# Patient Record
Sex: FEMALE | Race: WHITE | Employment: OTHER | URBAN - METROPOLITAN AREA
[De-identification: names, ages, dates, MRNs, and addresses within clinical notes are randomized per-mention and may not be internally consistent; named-entity substitution may affect disease eponyms.]

---

## 2017-02-08 ENCOUNTER — HOSPITAL ENCOUNTER (OUTPATIENT)
Dept: SURGERY | Age: 77
Discharge: HOME | DRG: 029 | End: 2017-02-08
Payer: MEDICARE

## 2017-02-08 VITALS
OXYGEN SATURATION: 99 % | WEIGHT: 109.06 LBS | HEIGHT: 62 IN | DIASTOLIC BLOOD PRESSURE: 63 MMHG | TEMPERATURE: 97.7 F | SYSTOLIC BLOOD PRESSURE: 137 MMHG | RESPIRATION RATE: 18 BRPM | HEART RATE: 70 BPM | BODY MASS INDEX: 20.07 KG/M2

## 2017-02-08 LAB
ANION GAP BLD CALC-SCNC: 6 MMOL/L (ref 7–16)
APPEARANCE UR: CLEAR
BACTERIA SPEC CULT: NORMAL
BASOPHILS # BLD AUTO: 0 K/UL (ref 0–0.2)
BASOPHILS # BLD: 0 % (ref 0–2)
BILIRUB UR QL: NEGATIVE
BUN SERPL-MCNC: 21 MG/DL (ref 8–23)
CALCIUM SERPL-MCNC: 9.5 MG/DL (ref 8.3–10.4)
CHLORIDE SERPL-SCNC: 106 MMOL/L (ref 98–107)
CO2 SERPL-SCNC: 31 MMOL/L (ref 21–32)
COLOR UR: YELLOW
CREAT SERPL-MCNC: 0.76 MG/DL (ref 0.6–1)
DIFFERENTIAL METHOD BLD: NORMAL
EOSINOPHIL # BLD: 0.1 K/UL (ref 0–0.8)
EOSINOPHIL NFR BLD: 2 % (ref 0.5–7.8)
ERYTHROCYTE [DISTWIDTH] IN BLOOD BY AUTOMATED COUNT: 13.1 % (ref 11.9–14.6)
GLUCOSE SERPL-MCNC: 91 MG/DL (ref 65–100)
GLUCOSE UR STRIP.AUTO-MCNC: NEGATIVE MG/DL
HCT VFR BLD AUTO: 38.1 % (ref 35.8–46.3)
HGB BLD-MCNC: 12.7 G/DL (ref 11.7–15.4)
HGB UR QL STRIP: NEGATIVE
IMM GRANULOCYTES # BLD: 0 K/UL (ref 0–0.5)
IMM GRANULOCYTES NFR BLD AUTO: 0.2 % (ref 0–5)
KETONES UR QL STRIP.AUTO: NEGATIVE MG/DL
LEUKOCYTE ESTERASE UR QL STRIP.AUTO: NEGATIVE
LYMPHOCYTES # BLD AUTO: 42 % (ref 13–44)
LYMPHOCYTES # BLD: 2.2 K/UL (ref 0.5–4.6)
MCH RBC QN AUTO: 31.2 PG (ref 26.1–32.9)
MCHC RBC AUTO-ENTMCNC: 33.3 G/DL (ref 31.4–35)
MCV RBC AUTO: 93.6 FL (ref 79.6–97.8)
MONOCYTES # BLD: 0.2 K/UL (ref 0.1–1.3)
MONOCYTES NFR BLD AUTO: 4 % (ref 4–12)
NEUTS SEG # BLD: 2.8 K/UL (ref 1.7–8.2)
NEUTS SEG NFR BLD AUTO: 52 % (ref 43–78)
NITRITE UR QL STRIP.AUTO: NEGATIVE
PH UR STRIP: 7 [PH] (ref 5–9)
PLATELET # BLD AUTO: 232 K/UL (ref 150–450)
PMV BLD AUTO: 12 FL (ref 10.8–14.1)
POTASSIUM SERPL-SCNC: 4.9 MMOL/L (ref 3.5–5.1)
PROT UR STRIP-MCNC: NEGATIVE MG/DL
RBC # BLD AUTO: 4.07 M/UL (ref 4.05–5.25)
SERVICE CMNT-IMP: NORMAL
SODIUM SERPL-SCNC: 143 MMOL/L (ref 136–145)
SP GR UR REFRACTOMETRY: 1.01 (ref 1–1.02)
UROBILINOGEN UR QL STRIP.AUTO: 0.2 EU/DL (ref 0.2–1)
WBC # BLD AUTO: 5.3 K/UL (ref 4.3–11.1)

## 2017-02-08 RX ORDER — LANOLIN ALCOHOL/MO/W.PET/CERES
400 CREAM (GRAM) TOPICAL
Status: ON HOLD | COMMUNITY
End: 2017-03-23 | Stop reason: CLARIF

## 2017-02-08 NOTE — PERIOP NOTES
Patient verified name, , and surgery as listed in [facility]. TYPE  CASE:1b  Labs per surgeon: CBC,BMP,U/A, MRSA  Labs per anesthesia protocol: no added   EKG  : 16   MRSA/MSSA:done  Pt instructed that they will be notified of positive results and will use mupirocin ointment as directed. Patient provided with handouts including guide to surgery , transfusions, pain management and hand hygiene for the family and community. Pt verbalizes understanding of all pre-op instructions . Instructed that family must be present in building at all times. Hibiclens and instructions given per hospital policy. Instructed patient to continue  previous medications as prescribed prior to surgery and  to take the following medications the day of surgery according to anesthesia guidelines : Omeprazole       Continue all previous medications unless otherwise directed. Original medication prescription bottles were not visualized during patient appointment. Instructed patient to hold  the following medications prior to surgery: Aspirin, Biotin, Multivitamin. Patient verbalized understanding of all instructions and provided all medical/health information to the best of their ability. Road to Recovery Spine surgery patient guide given. Instructed on incentive spirometry with return demonstration. Long handled prehab sponge given with instructions for use. Patient viewed spine prehab video.

## 2017-02-09 ENCOUNTER — ANESTHESIA EVENT (OUTPATIENT)
Dept: SURGERY | Age: 77
DRG: 029 | End: 2017-02-09
Payer: MEDICARE

## 2017-02-09 NOTE — PERIOP NOTES
MRSA/SA nasal swab results are both are negative. [facility] and requested old records.

## 2017-02-09 NOTE — PERIOP NOTES
Received faxed records from [facility] and placed on chart. These include last ov note and ekg 6/18/15,  Carotid ultrasound 4/25/13, normal myocardial perfusion scan 4/24/13.

## 2017-02-10 ENCOUNTER — APPOINTMENT (OUTPATIENT)
Dept: GENERAL RADIOLOGY | Age: 77
DRG: 029 | End: 2017-02-10
Attending: NEUROLOGICAL SURGERY
Payer: MEDICARE

## 2017-02-10 ENCOUNTER — HOSPITAL ENCOUNTER (INPATIENT)
Age: 77
LOS: 1 days | Discharge: HOME | DRG: 029 | End: 2017-02-11
Attending: NEUROLOGICAL SURGERY | Admitting: NEUROLOGICAL SURGERY
Payer: MEDICARE

## 2017-02-10 ENCOUNTER — ANESTHESIA (OUTPATIENT)
Dept: SURGERY | Age: 77
DRG: 029 | End: 2017-02-10
Payer: MEDICARE

## 2017-02-10 ENCOUNTER — SURGERY (OUTPATIENT)
Age: 77
End: 2017-02-10

## 2017-02-10 PROBLEM — G89.4 CHRONIC PAIN DISORDER: Status: ACTIVE | Noted: 2017-02-10

## 2017-02-10 PROCEDURE — 74011250636 HC RX REV CODE- 250/636: Performed by: ANESTHESIOLOGY

## 2017-02-10 PROCEDURE — 76060000034 HC ANESTHESIA 1.5 TO 2 HR: Performed by: NEUROLOGICAL SURGERY

## 2017-02-10 PROCEDURE — 74011000250 HC RX REV CODE- 250

## 2017-02-10 PROCEDURE — 77030018836 HC SOL IRR NACL ICUM -A: Performed by: NEUROLOGICAL SURGERY

## 2017-02-10 PROCEDURE — 77030014650 HC SEAL MTRX FLOSEL BAXT -C: Performed by: NEUROLOGICAL SURGERY

## 2017-02-10 PROCEDURE — 77030012935 HC DRSG AQUACEL BMS -B: Performed by: NEUROLOGICAL SURGERY

## 2017-02-10 PROCEDURE — 77030030163 HC BN WAX J&J -A: Performed by: NEUROLOGICAL SURGERY

## 2017-02-10 PROCEDURE — 77030011640 HC PAD GRND REM COVD -A: Performed by: NEUROLOGICAL SURGERY

## 2017-02-10 PROCEDURE — 77030020782 HC GWN BAIR PAWS FLX 3M -B: Performed by: ANESTHESIOLOGY

## 2017-02-10 PROCEDURE — 77030014007 HC SPNG HEMSTAT J&J -B: Performed by: NEUROLOGICAL SURGERY

## 2017-02-10 PROCEDURE — 72020 X-RAY EXAM OF SPINE 1 VIEW: CPT

## 2017-02-10 PROCEDURE — 77030008703 HC TU ET UNCUF COVD -A: Performed by: ANESTHESIOLOGY

## 2017-02-10 PROCEDURE — 76010000162 HC OR TIME 1.5 TO 2 HR INTENSV-TIER 1: Performed by: NEUROLOGICAL SURGERY

## 2017-02-10 PROCEDURE — 74011000250 HC RX REV CODE- 250: Performed by: NEUROLOGICAL SURGERY

## 2017-02-10 PROCEDURE — 77030002996 HC SUT SLK J&J -A: Performed by: NEUROLOGICAL SURGERY

## 2017-02-10 PROCEDURE — 74011250636 HC RX REV CODE- 250/636

## 2017-02-10 PROCEDURE — 77030003029 HC SUT VCRL J&J -B: Performed by: NEUROLOGICAL SURGERY

## 2017-02-10 PROCEDURE — 74011250637 HC RX REV CODE- 250/637: Performed by: NEUROLOGICAL SURGERY

## 2017-02-10 PROCEDURE — 77030008477 HC STYL SATN SLP COVD -A: Performed by: ANESTHESIOLOGY

## 2017-02-10 PROCEDURE — 0JH70CZ INSERTION OF SINGLE ARRAY RECHARGEABLE STIMULATOR GENERATOR INTO BACK SUBCUTANEOUS TISSUE AND FASCIA, OPEN APPROACH: ICD-10-PCS | Performed by: NEUROLOGICAL SURGERY

## 2017-02-10 PROCEDURE — 77030018390 HC SPNG HEMSTAT2 J&J -B: Performed by: NEUROLOGICAL SURGERY

## 2017-02-10 PROCEDURE — 77030034479 HC ADH SKN CLSR PRINEO J&J -B: Performed by: NEUROLOGICAL SURGERY

## 2017-02-10 PROCEDURE — 76210000016 HC OR PH I REC 1 TO 1.5 HR: Performed by: NEUROLOGICAL SURGERY

## 2017-02-10 PROCEDURE — 00HV3MZ INSERTION OF NEUROSTIMULATOR LEAD INTO SPINAL CORD, PERCUTANEOUS APPROACH: ICD-10-PCS | Performed by: NEUROLOGICAL SURGERY

## 2017-02-10 PROCEDURE — 65270000029 HC RM PRIVATE

## 2017-02-10 PROCEDURE — 74011250636 HC RX REV CODE- 250/636: Performed by: NEUROLOGICAL SURGERY

## 2017-02-10 PROCEDURE — C1820 GENERATOR NEURO RECHG BAT SY: HCPCS | Performed by: NEUROLOGICAL SURGERY

## 2017-02-10 PROCEDURE — 77030032490 HC SLV COMPR SCD KNE COVD -B: Performed by: NEUROLOGICAL SURGERY

## 2017-02-10 PROCEDURE — 74011000258 HC RX REV CODE- 258: Performed by: NEUROLOGICAL SURGERY

## 2017-02-10 PROCEDURE — 77030019557 HC ELECTRD VES SEAL MEDT -F: Performed by: NEUROLOGICAL SURGERY

## 2017-02-10 DEVICE — LEAD 977C165 SPECIFY SRSCN 565 SRG EMAN
Type: IMPLANTABLE DEVICE | Site: SPINE THORACIC | Status: FUNCTIONAL
Brand: SPECIFY® SURESCAN® MRI 5-6-5

## 2017-02-10 DEVICE — INS 97714 RESTORE SENSOR MRICS
Type: IMPLANTABLE DEVICE | Site: HIP | Status: FUNCTIONAL
Brand: RESTORESENSOR™SURESCAN®

## 2017-02-10 RX ORDER — MIRTAZAPINE 15 MG/1
15 TABLET, FILM COATED ORAL
Status: DISCONTINUED | OUTPATIENT
Start: 2017-02-10 | End: 2017-02-11 | Stop reason: HOSPADM

## 2017-02-10 RX ORDER — SIMVASTATIN 40 MG/1
40 TABLET, FILM COATED ORAL
Status: DISCONTINUED | OUTPATIENT
Start: 2017-02-10 | End: 2017-02-11 | Stop reason: HOSPADM

## 2017-02-10 RX ORDER — LIDOCAINE HYDROCHLORIDE 20 MG/ML
INJECTION, SOLUTION EPIDURAL; INFILTRATION; INTRACAUDAL; PERINEURAL AS NEEDED
Status: DISCONTINUED | OUTPATIENT
Start: 2017-02-10 | End: 2017-02-10 | Stop reason: HOSPADM

## 2017-02-10 RX ORDER — LIDOCAINE HYDROCHLORIDE AND EPINEPHRINE 10; 10 MG/ML; UG/ML
INJECTION, SOLUTION INFILTRATION; PERINEURAL AS NEEDED
Status: DISCONTINUED | OUTPATIENT
Start: 2017-02-10 | End: 2017-02-10 | Stop reason: HOSPADM

## 2017-02-10 RX ORDER — NEOSTIGMINE METHYLSULFATE 1 MG/ML
INJECTION INTRAVENOUS AS NEEDED
Status: DISCONTINUED | OUTPATIENT
Start: 2017-02-10 | End: 2017-02-10 | Stop reason: HOSPADM

## 2017-02-10 RX ORDER — CEFAZOLIN SODIUM 1 G/3ML
INJECTION, POWDER, FOR SOLUTION INTRAMUSCULAR; INTRAVENOUS AS NEEDED
Status: DISCONTINUED | OUTPATIENT
Start: 2017-02-10 | End: 2017-02-10 | Stop reason: HOSPADM

## 2017-02-10 RX ORDER — LOTEPREDNOL ETABONATE 5 MG/ML
1 SUSPENSION/ DROPS OPHTHALMIC 2 TIMES DAILY
Status: DISCONTINUED | OUTPATIENT
Start: 2017-02-10 | End: 2017-02-11 | Stop reason: HOSPADM

## 2017-02-10 RX ORDER — OXYCODONE AND ACETAMINOPHEN 7.5; 325 MG/1; MG/1
1 TABLET ORAL
Qty: 30 TAB | Refills: 0 | Status: SHIPPED | OUTPATIENT
Start: 2017-02-10

## 2017-02-10 RX ORDER — MELATONIN
1000
Status: DISCONTINUED | OUTPATIENT
Start: 2017-02-10 | End: 2017-02-11 | Stop reason: HOSPADM

## 2017-02-10 RX ORDER — GUAIFENESIN 100 MG/5ML
81 LIQUID (ML) ORAL
Status: DISCONTINUED | OUTPATIENT
Start: 2017-02-10 | End: 2017-02-11 | Stop reason: HOSPADM

## 2017-02-10 RX ORDER — SODIUM CHLORIDE, SODIUM LACTATE, POTASSIUM CHLORIDE, CALCIUM CHLORIDE 600; 310; 30; 20 MG/100ML; MG/100ML; MG/100ML; MG/100ML
100 INJECTION, SOLUTION INTRAVENOUS CONTINUOUS
Status: DISPENSED | OUTPATIENT
Start: 2017-02-10 | End: 2017-02-11

## 2017-02-10 RX ORDER — ROCURONIUM BROMIDE 10 MG/ML
INJECTION, SOLUTION INTRAVENOUS AS NEEDED
Status: DISCONTINUED | OUTPATIENT
Start: 2017-02-10 | End: 2017-02-10 | Stop reason: HOSPADM

## 2017-02-10 RX ORDER — SODIUM CHLORIDE 0.9 % (FLUSH) 0.9 %
5-10 SYRINGE (ML) INJECTION EVERY 8 HOURS
Status: DISCONTINUED | OUTPATIENT
Start: 2017-02-10 | End: 2017-02-11 | Stop reason: HOSPADM

## 2017-02-10 RX ORDER — PROPOFOL 10 MG/ML
INJECTION, EMULSION INTRAVENOUS AS NEEDED
Status: DISCONTINUED | OUTPATIENT
Start: 2017-02-10 | End: 2017-02-10 | Stop reason: HOSPADM

## 2017-02-10 RX ORDER — PANTOPRAZOLE SODIUM 40 MG/1
40 TABLET, DELAYED RELEASE ORAL
Status: DISCONTINUED | OUTPATIENT
Start: 2017-02-11 | End: 2017-02-11 | Stop reason: HOSPADM

## 2017-02-10 RX ORDER — ACETAMINOPHEN 325 MG/1
650 TABLET ORAL
Status: DISCONTINUED | OUTPATIENT
Start: 2017-02-10 | End: 2017-02-11 | Stop reason: HOSPADM

## 2017-02-10 RX ORDER — SODIUM CHLORIDE, SODIUM LACTATE, POTASSIUM CHLORIDE, CALCIUM CHLORIDE 600; 310; 30; 20 MG/100ML; MG/100ML; MG/100ML; MG/100ML
100 INJECTION, SOLUTION INTRAVENOUS CONTINUOUS
Status: DISCONTINUED | OUTPATIENT
Start: 2017-02-10 | End: 2017-02-10 | Stop reason: HOSPADM

## 2017-02-10 RX ORDER — ONDANSETRON 2 MG/ML
INJECTION INTRAMUSCULAR; INTRAVENOUS AS NEEDED
Status: DISCONTINUED | OUTPATIENT
Start: 2017-02-10 | End: 2017-02-10 | Stop reason: HOSPADM

## 2017-02-10 RX ORDER — POTASSIUM CHLORIDE 750 MG/1
10 TABLET, EXTENDED RELEASE ORAL
Status: DISCONTINUED | OUTPATIENT
Start: 2017-02-11 | End: 2017-02-11 | Stop reason: HOSPADM

## 2017-02-10 RX ORDER — VANCOMYCIN HYDROCHLORIDE 1 G/20ML
INJECTION, POWDER, LYOPHILIZED, FOR SOLUTION INTRAVENOUS AS NEEDED
Status: DISCONTINUED | OUTPATIENT
Start: 2017-02-10 | End: 2017-02-10 | Stop reason: HOSPADM

## 2017-02-10 RX ORDER — ZOLPIDEM TARTRATE 10 MG/1
5 TABLET ORAL
Status: DISCONTINUED | OUTPATIENT
Start: 2017-02-10 | End: 2017-02-11 | Stop reason: HOSPADM

## 2017-02-10 RX ORDER — HYDROMORPHONE HYDROCHLORIDE 2 MG/ML
0.5 INJECTION, SOLUTION INTRAMUSCULAR; INTRAVENOUS; SUBCUTANEOUS
Status: COMPLETED | OUTPATIENT
Start: 2017-02-10 | End: 2017-02-10

## 2017-02-10 RX ORDER — OXYCODONE AND ACETAMINOPHEN 7.5; 325 MG/1; MG/1
1 TABLET ORAL
Status: DISCONTINUED | OUTPATIENT
Start: 2017-02-10 | End: 2017-02-11 | Stop reason: HOSPADM

## 2017-02-10 RX ORDER — MIDAZOLAM HYDROCHLORIDE 1 MG/ML
2 INJECTION, SOLUTION INTRAMUSCULAR; INTRAVENOUS ONCE
Status: ACTIVE | OUTPATIENT
Start: 2017-02-10 | End: 2017-02-10

## 2017-02-10 RX ORDER — FENTANYL CITRATE 50 UG/ML
100 INJECTION, SOLUTION INTRAMUSCULAR; INTRAVENOUS ONCE
Status: ACTIVE | OUTPATIENT
Start: 2017-02-10 | End: 2017-02-10

## 2017-02-10 RX ORDER — FENTANYL CITRATE 50 UG/ML
INJECTION, SOLUTION INTRAMUSCULAR; INTRAVENOUS AS NEEDED
Status: DISCONTINUED | OUTPATIENT
Start: 2017-02-10 | End: 2017-02-10 | Stop reason: HOSPADM

## 2017-02-10 RX ORDER — LANOLIN ALCOHOL/MO/W.PET/CERES
400 CREAM (GRAM) TOPICAL
Status: DISCONTINUED | OUTPATIENT
Start: 2017-02-10 | End: 2017-02-11 | Stop reason: HOSPADM

## 2017-02-10 RX ORDER — DIAPER,BRIEF,INFANT-TODD,DISP
10000 EACH MISCELLANEOUS
Status: DISCONTINUED | OUTPATIENT
Start: 2017-02-11 | End: 2017-02-11 | Stop reason: HOSPADM

## 2017-02-10 RX ORDER — GLYCOPYRROLATE 0.2 MG/ML
INJECTION INTRAMUSCULAR; INTRAVENOUS AS NEEDED
Status: DISCONTINUED | OUTPATIENT
Start: 2017-02-10 | End: 2017-02-10 | Stop reason: HOSPADM

## 2017-02-10 RX ORDER — MIDAZOLAM HYDROCHLORIDE 1 MG/ML
2 INJECTION, SOLUTION INTRAMUSCULAR; INTRAVENOUS
Status: DISCONTINUED | OUTPATIENT
Start: 2017-02-10 | End: 2017-02-11 | Stop reason: HOSPADM

## 2017-02-10 RX ORDER — SODIUM CHLORIDE, SODIUM LACTATE, POTASSIUM CHLORIDE, CALCIUM CHLORIDE 600; 310; 30; 20 MG/100ML; MG/100ML; MG/100ML; MG/100ML
75 INJECTION, SOLUTION INTRAVENOUS CONTINUOUS
Status: DISCONTINUED | OUTPATIENT
Start: 2017-02-10 | End: 2017-02-11 | Stop reason: HOSPADM

## 2017-02-10 RX ORDER — ACETAMINOPHEN 10 MG/ML
1000 INJECTION, SOLUTION INTRAVENOUS ONCE
Status: COMPLETED | OUTPATIENT
Start: 2017-02-10 | End: 2017-02-10

## 2017-02-10 RX ORDER — OXYCODONE HYDROCHLORIDE 5 MG/1
5 TABLET ORAL
Status: DISCONTINUED | OUTPATIENT
Start: 2017-02-10 | End: 2017-02-10 | Stop reason: HOSPADM

## 2017-02-10 RX ORDER — SODIUM CHLORIDE 0.9 % (FLUSH) 0.9 %
5-10 SYRINGE (ML) INJECTION AS NEEDED
Status: DISCONTINUED | OUTPATIENT
Start: 2017-02-10 | End: 2017-02-11 | Stop reason: HOSPADM

## 2017-02-10 RX ORDER — LIDOCAINE HYDROCHLORIDE 10 MG/ML
0.1 INJECTION INFILTRATION; PERINEURAL AS NEEDED
Status: DISCONTINUED | OUTPATIENT
Start: 2017-02-10 | End: 2017-02-11 | Stop reason: HOSPADM

## 2017-02-10 RX ORDER — DEXAMETHASONE SODIUM PHOSPHATE 4 MG/ML
INJECTION, SOLUTION INTRA-ARTICULAR; INTRALESIONAL; INTRAMUSCULAR; INTRAVENOUS; SOFT TISSUE AS NEEDED
Status: DISCONTINUED | OUTPATIENT
Start: 2017-02-10 | End: 2017-02-10 | Stop reason: HOSPADM

## 2017-02-10 RX ADMIN — LIDOCAINE HYDROCHLORIDE AND EPINEPHRINE 6 ML: 10; 10 INJECTION, SOLUTION INFILTRATION; PERINEURAL at 13:18

## 2017-02-10 RX ADMIN — LIDOCAINE HYDROCHLORIDE 60 MG: 20 INJECTION, SOLUTION EPIDURAL; INFILTRATION; INTRACAUDAL; PERINEURAL at 11:58

## 2017-02-10 RX ADMIN — ONDANSETRON 4 MG: 2 INJECTION INTRAMUSCULAR; INTRAVENOUS at 12:37

## 2017-02-10 RX ADMIN — HYDROMORPHONE HYDROCHLORIDE 0.5 MG: 2 INJECTION, SOLUTION INTRAMUSCULAR; INTRAVENOUS; SUBCUTANEOUS at 14:15

## 2017-02-10 RX ADMIN — FENTANYL CITRATE 100 MCG: 50 INJECTION, SOLUTION INTRAMUSCULAR; INTRAVENOUS at 11:54

## 2017-02-10 RX ADMIN — OXYCODONE HYDROCHLORIDE AND ACETAMINOPHEN 1 TABLET: 7.5; 325 TABLET ORAL at 22:32

## 2017-02-10 RX ADMIN — VANCOMYCIN HYDROCHLORIDE 750 MG: 10 INJECTION, POWDER, LYOPHILIZED, FOR SOLUTION INTRAVENOUS at 21:00

## 2017-02-10 RX ADMIN — FENTANYL CITRATE 100 MCG: 50 INJECTION, SOLUTION INTRAMUSCULAR; INTRAVENOUS at 12:25

## 2017-02-10 RX ADMIN — OXYCODONE HYDROCHLORIDE AND ACETAMINOPHEN 1 TABLET: 7.5; 325 TABLET ORAL at 17:23

## 2017-02-10 RX ADMIN — Medication 400 MG: at 17:23

## 2017-02-10 RX ADMIN — SODIUM CHLORIDE, SODIUM LACTATE, POTASSIUM CHLORIDE, AND CALCIUM CHLORIDE 100 ML/HR: 600; 310; 30; 20 INJECTION, SOLUTION INTRAVENOUS at 09:00

## 2017-02-10 RX ADMIN — NEOSTIGMINE METHYLSULFATE 3 MG: 1 INJECTION INTRAVENOUS at 13:20

## 2017-02-10 RX ADMIN — HYDROMORPHONE HYDROCHLORIDE 0.5 MG: 2 INJECTION, SOLUTION INTRAMUSCULAR; INTRAVENOUS; SUBCUTANEOUS at 13:53

## 2017-02-10 RX ADMIN — VANCOMYCIN HYDROCHLORIDE 1 G: 1 INJECTION, POWDER, LYOPHILIZED, FOR SOLUTION INTRAVENOUS at 12:27

## 2017-02-10 RX ADMIN — SIMVASTATIN 40 MG: 40 TABLET, FILM COATED ORAL at 22:32

## 2017-02-10 RX ADMIN — THROMBIN, TOPICAL (BOVINE) 10000 UNITS: KIT at 12:28

## 2017-02-10 RX ADMIN — Medication 10 ML: at 22:33

## 2017-02-10 RX ADMIN — HYDROMORPHONE HYDROCHLORIDE 0.5 MG: 2 INJECTION, SOLUTION INTRAMUSCULAR; INTRAVENOUS; SUBCUTANEOUS at 13:58

## 2017-02-10 RX ADMIN — ASPIRIN 81 MG 81 MG: 81 TABLET ORAL at 22:32

## 2017-02-10 RX ADMIN — ROCURONIUM BROMIDE 40 MG: 10 INJECTION, SOLUTION INTRAVENOUS at 11:58

## 2017-02-10 RX ADMIN — HYDROMORPHONE HYDROCHLORIDE 0.5 MG: 2 INJECTION, SOLUTION INTRAMUSCULAR; INTRAVENOUS; SUBCUTANEOUS at 14:05

## 2017-02-10 RX ADMIN — VITAMIN D, TAB 1000IU (100/BT) 1000 UNITS: 25 TAB at 17:23

## 2017-02-10 RX ADMIN — MIRTAZAPINE 15 MG: 15 TABLET, FILM COATED ORAL at 22:32

## 2017-02-10 RX ADMIN — GLYCOPYRROLATE 0.4 MG: 0.2 INJECTION INTRAMUSCULAR; INTRAVENOUS at 13:20

## 2017-02-10 RX ADMIN — DEXAMETHASONE SODIUM PHOSPHATE 4 MG: 4 INJECTION, SOLUTION INTRA-ARTICULAR; INTRALESIONAL; INTRAMUSCULAR; INTRAVENOUS; SOFT TISSUE at 12:37

## 2017-02-10 RX ADMIN — SODIUM CHLORIDE, SODIUM LACTATE, POTASSIUM CHLORIDE, AND CALCIUM CHLORIDE: 600; 310; 30; 20 INJECTION, SOLUTION INTRAVENOUS at 13:20

## 2017-02-10 RX ADMIN — PROPOFOL 150 MG: 10 INJECTION, EMULSION INTRAVENOUS at 11:58

## 2017-02-10 RX ADMIN — LOTEPREDNOL ETABONATE 1 DROP: 5 SUSPENSION/ DROPS OPHTHALMIC at 18:00

## 2017-02-10 RX ADMIN — VANCOMYCIN HYDROCHLORIDE 750 MG: 10 INJECTION, POWDER, LYOPHILIZED, FOR SOLUTION INTRAVENOUS at 09:00

## 2017-02-10 RX ADMIN — CEFAZOLIN SODIUM 2 G: 1 INJECTION, POWDER, FOR SOLUTION INTRAMUSCULAR; INTRAVENOUS at 12:18

## 2017-02-10 RX ADMIN — VANCOMYCIN HYDROCHLORIDE 1 G: 1 INJECTION, POWDER, LYOPHILIZED, FOR SOLUTION INTRAVENOUS at 12:28

## 2017-02-10 RX ADMIN — ACETAMINOPHEN 1000 MG: 10 INJECTION, SOLUTION INTRAVENOUS at 14:22

## 2017-02-10 RX ADMIN — SODIUM CHLORIDE, SODIUM LACTATE, POTASSIUM CHLORIDE, AND CALCIUM CHLORIDE 75 ML/HR: 600; 310; 30; 20 INJECTION, SOLUTION INTRAVENOUS at 15:39

## 2017-02-10 RX ADMIN — AZTREONAM 2 G: 2 INJECTION, POWDER, LYOPHILIZED, FOR SOLUTION INTRAMUSCULAR; INTRAVENOUS at 11:53

## 2017-02-10 NOTE — IP AVS SNAPSHOT
Roya Saunders 
 
 
 [address] [facility] 
[phone] Patient: Adwoa Montgomery MRN: [id number] EAS:[birth year] You are allergic to the following Allergen Reactions Lortab (Hydrocodone-Acetaminophen) Unknown (comments) Lunesta (Eszopiclone) Other (comments) Nightmares Recent Documentation Height Weight Breastfeeding? BMI OB Status Smoking Status 1.575 m 49.5 kg No 19.95 kg/m2 Hysterectomy Never Smoker Emergency Contacts Name Discharge Info Relation Home Work Mobile Cheng Martinez DISCHARGE CAREGIVER [3] Spouse [3] [phone] About your hospitalization You were admitted on:  February 10, 2017 You last received care in the:  [facility] You were discharged on:  February 11, 2017 Unit phone number:  [phone] Why you were hospitalized Your primary diagnosis was:  Chronic Pain Disorder Providers Seen During Your Hospitalizations Provider Role Specialty Primary office phone Amber Jaime MD Attending Provider Neurosurgery [phone] Your Primary Care Physician (PCP) Primary Care Physician Office Phone Office Fax Jessica Terrazas [phone] Follow-up Information Follow up With Details Comments Contact Info Mishra [employer], DO   [address] [facility] [address] [phone] Amber Jaime MD Call Call on monday for appointment in 10 days  11 [facility] [address] [facility] [address] [address] [phone] Current Discharge Medication List  
  
CONTINUE these medications which have CHANGED Dose & Instructions Dispensing Information Comments Morning Noon Evening Bedtime * oxyCODONE-acetaminophen 7.5-325 mg per tablet Commonly known as:  PERCOCET 7.5 What changed:  Another medication with the same name was added. Make sure you understand how and when to take each. Your next dose is: Today, Tomorrow Other:  _________ Dose:  1 Tab Take 1 Tab by mouth every eight (8) hours as needed for Pain. Max Daily Amount: 3 Tabs. Quantity:  21 Tab Refills:  0  
     
   
   
   
  
 * oxyCODONE-acetaminophen 7.5-325 mg per tablet Commonly known as:  PERCOCET 7.5 What changed: You were already taking a medication with the same name, and this prescription was added. Make sure you understand how and when to take each. Your next dose is: Today, Tomorrow Other:  _________ Dose:  1 Tab Take 1 Tab by mouth every eight (8) hours as needed for Pain. Max Daily Amount: 3 Tabs. Quantity:  30 Tab Refills:  0  
     
   
   
   
  
 * Notice: This list has 2 medication(s) that are the same as other medications prescribed for you. Read the directions carefully, and ask your doctor or other care provider to review them with you. CONTINUE these medications which have NOT CHANGED Dose & Instructions Dispensing Information Comments Morning Noon Evening Bedtime  
 aspirin 81 mg chewable tablet Your next dose is: Today, Tomorrow Other:  _________ Dose:  81 mg Take 81 mg by mouth nightly. Refills:  0  
     
   
   
   
  
 biotin 10,000 mcg Cap Your next dose is: Today, Tomorrow Other:  _________ Take  by mouth daily (after lunch). Refills:  0 LOTEMAX 0.5 % ophthalmic suspension Generic drug:  loteprednol etabonate Your next dose is: Today, Tomorrow Other:  _________ Dose:  1 Drop Administer 1 Drop to both eyes two (2) times a day. Refills:  0  
     
   
   
   
  
 magnesium oxide 400 mg tablet Commonly known as:  MAG-OX Your next dose is: Today, Tomorrow Other:  _________ Dose:  400 mg Take 400 mg by mouth daily (after lunch). Refills:  0  
     
   
   
   
  
 mirtazapine 15 mg tablet Commonly known as:  Shelagh Kick Your next dose is: Today, Tomorrow Other:  _________ Dose:  15 mg Take 1 Tab by mouth nightly. Patient needs an Office visit for further refill Quantity:  90 Tab Refills:  3  
     
   
   
   
  
 multivitamin tablet Commonly known as:  ONE A DAY Your next dose is: Today, Tomorrow Other:  _________ Dose:  1 Tab Take 1 Tab by mouth daily (after lunch). Refills:  0  
     
   
   
   
  
 omeprazole 20 mg capsule Commonly known as:  PRILOSEC Your next dose is: Today, Tomorrow Other:  _________ Dose:  20 mg Take 1 Cap by mouth every morning. Indications: GASTROESOPHAGEAL REFLUX Quantity:  90 Cap Refills:  1  
     
   
   
   
  
 potassium 99 mg tablet Your next dose is: Today, Tomorrow Other:  _________ Dose:  99 mg Take 99 mg by mouth daily (after lunch). Refills:  0  
     
   
   
   
  
 simvastatin 40 mg tablet Commonly known as:  ZOCOR Your next dose is: Today, Tomorrow Other:  _________ Dose:  40 mg Take 1 Tab by mouth nightly. Indications: HYPERCHOLESTEROLEMIA Quantity:  90 Tab Refills:  1  
     
   
   
   
  
 traMADol 50 mg tablet Commonly known as:  ULTRAM  
   
Your next dose is: Today, Tomorrow Other:  _________ Dose:  50 mg Take 1 Tab by mouth every eight (8) hours as needed for Pain. Max Daily Amount: 150 mg.  
 Quantity:  90 Tab Refills:  3 VITAMIN D3 1,000 unit tablet Generic drug:  cholecalciferol Your next dose is: Today, Tomorrow Other:  _________ Dose:  1000 Units Take 1,000 Units by mouth daily (after lunch). Refills:  0 Where to Get Your Medications Information on where to get these meds will be given to you by the nurse or doctor. ! Ask your nurse or doctor about these medications  
  oxyCODONE-acetaminophen 7.5-325 mg per tablet Discharge Instructions NO showers/baths-->SPONGE BATH ONLY CHANGE dressing DAILYsponge bath-->remove old dressing-->apply fresh dressing ACTIVITY as tolerated MAY drive as directed Avoid having pets sleep in bed with you until incision is completely healed CALL DR. Steele:  Fever >100.5 Incision becomes red, swollen or opens up Incision has yellow, thick drainage or an odor Pain is not managed with prescribed medications Excessive nausea and/or vomiting 
 
-->If you have a question about how to use the stimulator please call the Rep for the stimulator Discharge Orders None ACO Transitions of Care Introducing [employer] [address] Hilda Anguiano offers a voluntary care coordination program to provide high quality service and care to [facility] fee-for-service beneficiaries. Anthony Sullivan was designed to help you enhance your health and well-being through the following services: ? Transitions of Care  support for individuals who are transitioning from one care setting to another (example: Hospital to home). ? Chronic and Complex Care Coordination  support for individuals and caregivers of those with serious or chronic illnesses or with more than one chronic (ongoing) condition and those who take a number of different medications. If you meet specific medical criteria, a [facility] [address] may call you directly to coordinate your care with your primary care physician and your other care providers. For questions about the [facility] programs, please, contact your physicians office. For general questions or additional information about Accountable Care Organizations: 
Please visit www.medicare.gov/acos. html or call 1-800-MEDICARE (7-165.823.3026) TTY users should call 0-528.880.8017. Introducing [facility] SERVICES! Ba Major introduces [employer] patient portal. Now you can access parts of your medical record, email your doctor's office, and request medication refills online. 1. In your internet browser, go to [email or web address] 2. Click on the First Time User? Click Here link in the Sign In box. You will see the New Member Sign Up page. 3. Enter your [employer] Access Code exactly as it appears below. You will not need to use this code after youve completed the sign-up process. If you do not sign up before the expiration date, you must request a new code. · [employer] Access Code: [email or web address] Expires: 4/19/2017  3:25 PM 
 
4. Enter the last four digits of your Social Security Number (xxxx) and Date of Birth (mm/dd/yyyy) as indicated and click Submit. You will be taken to the next sign-up page. 5. Create a [employer] ID. This will be your [employer] login ID and cannot be changed, so think of one that is secure and easy to remember. 6. Create a [employer] password. You can change your password at any time. 7. Enter your Password Reset Question and Answer. This can be used at a later time if you forget your password. 8. Enter your e-mail address. You will receive e-mail notification when new information is available in [address]. 9. Click Sign Up. You can now view and download portions of your medical record. 10. Click the Download Summary menu link to download a portable copy of your medical information. If you have questions, please visit the Frequently Asked Questions section of the [employer] website. Remember, [employer] is NOT to be used for urgent needs. For medical emergencies, dial 911. Now available from your iPhone and Android! General Information Please provide this summary of care documentation to your next provider. Patient Signature:  ____________________________________________________________ Date:  ____________________________________________________________  
  
[place] [facility] Provider Signature:  ____________________________________________________________ Date:  ____________________________________________________________

## 2017-02-10 NOTE — ANESTHESIA POSTPROCEDURE EVALUATION
Post-Anesthesia Evaluation and Assessment    Patient: Mamie Ac MRN: [id number]  SSN: [id number]    YOB: 1940  Age: 68 y.o. Sex: female       Cardiovascular Function/Vital Signs  Visit Vitals    /84 (BP 1 Location: Right arm, BP Patient Position: At rest)    Pulse 83    Temp 36.7 °C (98 °F)    Resp 14    Ht 5' 2\" (1.575 m)    Wt 49.5 kg (109 lb 1 oz)    SpO2 100%    BMI 19.95 kg/m2       Patient is status post general anesthesia for Procedure(s):  SPINAL CORD STIMULATOR INSERTION. Nausea/Vomiting: None    Postoperative hydration reviewed and adequate. Pain:  Pain Scale 1: Numeric (0 - 10) (02/10/17 1434)  Pain Intensity 1: 6 (02/10/17 1434)   Managed    Neurological Status:   Neuro (WDL): Within Defined Limits (02/10/17 1434)  Neuro  Neurologic State: Alert (02/10/17 1434)  LUE Motor Response: Purposeful (02/10/17 1434)  LLE Motor Response: Purposeful (02/10/17 1434)  RUE Motor Response: Purposeful (02/10/17 1434)  RLE Motor Response: Purposeful (02/10/17 1434)   At baseline    Mental Status and Level of Consciousness: Awake. Pulmonary Status:   O2 Device: Nasal cannula (02/10/17 1434)   Adequate oxygenation and airway patent    Complications related to anesthesia: None    Post-anesthesia assessment completed.  No concerns    Signed By: Bravo Cobos MD     February 10, 2017

## 2017-02-10 NOTE — PROGRESS NOTES
Nutrition  Reason for assessment: Referral received from nursing admission Malnutrition Screening Tool for recently lost 6# without trying and eating poorly due to decreased appetite. Assessment:   Weights per EMR:  WT / BMI 2/10/2017 2/8/2017 1/19/2017 7/20/2016   WEIGHT 109 lb 1 oz 109 lb 1 oz 100 lb 109 lb 1 oz   BODY MASS INDEX 19.95 kg/m2 19.95 kg/m2 18.89 kg/m2 20.62 kg/m2     WT / BMI 7/18/2016 6/27/2016 4/15/2016   WEIGHT 109 lb 1 oz 110 lb 110 lb 6.4 oz   BODY MASS INDEX 20.62 kg/m2 20.8 kg/m2 20.87 kg/m2   RD cannot verify if these weights are accurate (estimated vs stated vs measured). Per weights above pt with a clinically insignificant 1# weight loss over the past 10 months. MST screen is deferred at this time. RD to order ensure enlive due to reports of decreased appetite upon admission. RD to follow up per standards of care. Daquan Mcbride [address], [address] [phone]

## 2017-02-10 NOTE — PROGRESS NOTES
TRANSFER - IN REPORT:    Verbal report received from SUHAS Ramirez on Izell Sicks  being received from PACU for routine progression of care      Report consisted of patients Situation, Background, Assessment and   Recommendations(SBAR). Information from the following report(s) SBAR, Kardex and OR Summary was reviewed with the receiving nurse. Opportunity for questions and clarification was provided. Assessment completed upon patients arrival to unit and room 704 and care assumed.

## 2017-02-10 NOTE — BRIEF OP NOTE
BRIEF OPERATIVE NOTE    Date of Procedure: 2/10/2017   Preoperative Diagnosis: Chronic pain syndrome [G89.4]  Postoperative Diagnosis: Chronic pain syndrome [G89.4]    Procedure(s):  SPINAL CORD STIMULATOR INSERTION  Surgeon(s) and Role:     * Alona Omer MD - Primary            Surgical Staff:  Circ-1: Luke Pryor RN  Circ-Relief: Jorge Luz RN  Radiology Technician: Poornima Coley, RT, R, CT  Scrub Tech-1: Jennifer Wei  Scrub Tech-2: Edilma Hernández  Scrub Tech-Relief: Indu Gray  Scrub Private/Assistant: Fermín Huber  Event Time In   Incision Start 1218   Incision Close      Anesthesia: General   Estimated Blood Loss: MINIMAL  Specimens: * No specimens in log *   Findings: T 8 PLACEMENT  Complications: NONE  Implants:   Implant Name Type Inv.  Item Serial No.  Lot No. LRB No. Used Action   specify surescan mri lead kit    [employer] [id number] N/A 1 Implanted   GENERATOR NEUROSTIM RCHRG RSSS -- Casey Nico MRI - [id number]   GENERATOR NEUROSTIM RCHRG RSSS -- Casey Nico MRI [id number] [employer]   Left 1 Implanted

## 2017-02-10 NOTE — PROGRESS NOTES
Dual skin assessment completed by this RN and Ximena Mendez RN. Patient has dressing to lower back and left side. Heels are dry and flaky, no other skin issues noted.

## 2017-02-10 NOTE — ROUTINE PROCESS
TRANSFER - IN REPORT:    Verbal report received from [address] on Orpah Ebbing  being received from [phone]  for routine progression of care      Report consisted of patients Situation, Background, Assessment and   Recommendations(SBAR). Information from the following report(s) SBAR, Intake/Output and MAR was reviewed with the receiving nurse. Opportunity for questions and clarification was provided. Assessment completed upon patients arrival to unit and care assumed.

## 2017-02-10 NOTE — ANESTHESIA PREPROCEDURE EVALUATION
Anesthetic History     PONV          Review of Systems / Medical History  Pertinent labs reviewed    Pulmonary  Within defined limits                 Neuro/Psych   Within defined limits          Comments: Parkinsonism Cardiovascular              CAD (angioplasty 2014)    Exercise tolerance: <4 METS: Limited by back pain       GI/Hepatic/Renal     GERD           Endo/Other        Arthritis and anemia     Other Findings            Physical Exam    Airway  Mallampati: II  TM Distance: 4 - 6 cm  Neck ROM: normal range of motion   Mouth opening: Normal     Cardiovascular  Regular rate and rhythm,  S1 and S2 normal,  no murmur, click, rub, or gallop             Dental    Dentition: Full upper dentures and Lower partial plate     Pulmonary  Breath sounds clear to auscultation               Abdominal  GI exam deferred       Other Findings            Anesthetic Plan    ASA: 2  Anesthesia type: general          Induction: Intravenous  Anesthetic plan and risks discussed with: Patient and Family

## 2017-02-10 NOTE — IP AVS SNAPSHOT
Current Discharge Medication List  
  
Take these medications at their scheduled times Dose & Instructions Dispensing Information Comments Morning Noon Evening Bedtime  
 aspirin 81 mg chewable tablet Your next dose is: Today, Tomorrow Other:  ____________ Dose:  81 mg Take 81 mg by mouth nightly. Refills:  0  
     
   
   
   
  
 biotin 10,000 mcg Cap Your next dose is: Today, Tomorrow Other:  ____________ Take  by mouth daily (after lunch). Refills:  0 LOTEMAX 0.5 % ophthalmic suspension Generic drug:  loteprednol etabonate Your next dose is: Today, Tomorrow Other:  ____________ Dose:  1 Drop Administer 1 Drop to both eyes two (2) times a day. Refills:  0  
     
   
   
   
  
 magnesium oxide 400 mg tablet Commonly known as:  MAG-OX Your next dose is: Today, Tomorrow Other:  ____________ Dose:  400 mg Take 400 mg by mouth daily (after lunch). Refills:  0  
     
   
   
   
  
 mirtazapine 15 mg tablet Commonly known as:  Kwasi Section Your next dose is: Today, Tomorrow Other:  ____________ Dose:  15 mg Take 1 Tab by mouth nightly. Patient needs an Office visit for further refill Quantity:  90 Tab Refills:  3  
     
   
   
   
  
 multivitamin tablet Commonly known as:  ONE A DAY Your next dose is: Today, Tomorrow Other:  ____________ Dose:  1 Tab Take 1 Tab by mouth daily (after lunch). Refills:  0  
     
   
   
   
  
 omeprazole 20 mg capsule Commonly known as:  PRILOSEC Your next dose is: Today, Tomorrow Other:  ____________ Dose:  20 mg Take 1 Cap by mouth every morning. Indications: GASTROESOPHAGEAL REFLUX Quantity:  90 Cap Refills:  1  
     
   
   
   
  
 potassium 99 mg tablet Your next dose is: Today, Tomorrow Other:  ____________ Dose:  99 mg Take 99 mg by mouth daily (after lunch). Refills:  0  
     
   
   
   
  
 simvastatin 40 mg tablet Commonly known as:  ZOCOR Your next dose is: Today, Tomorrow Other:  ____________ Dose:  40 mg Take 1 Tab by mouth nightly. Indications: HYPERCHOLESTEROLEMIA Quantity:  90 Tab Refills:  1 VITAMIN D3 1,000 unit tablet Generic drug:  cholecalciferol Your next dose is: Today, Tomorrow Other:  ____________ Dose:  1000 Units Take 1,000 Units by mouth daily (after lunch). Refills:  0 Take these medications as needed Dose & Instructions Dispensing Information Comments Morning Noon Evening Bedtime * oxyCODONE-acetaminophen 7.5-325 mg per tablet Commonly known as:  PERCOCET 7.5 Your next dose is: Today, Tomorrow Other:  ____________ Dose:  1 Tab Take 1 Tab by mouth every eight (8) hours as needed for Pain. Max Daily Amount: 3 Tabs. Quantity:  21 Tab Refills:  0  
     
   
   
   
  
 * oxyCODONE-acetaminophen 7.5-325 mg per tablet Commonly known as:  PERCOCET 7.5 Your next dose is: Today, Tomorrow Other:  ____________ Dose:  1 Tab Take 1 Tab by mouth every eight (8) hours as needed for Pain. Max Daily Amount: 3 Tabs. Quantity:  30 Tab Refills:  0  
     
   
   
   
  
 traMADol 50 mg tablet Commonly known as:  ULTRAM  
   
Your next dose is: Today, Tomorrow Other:  ____________ Dose:  50 mg Take 1 Tab by mouth every eight (8) hours as needed for Pain. Max Daily Amount: 150 mg.  
 Quantity:  90 Tab Refills:  3  
     
   
   
   
  
 * Notice: This list has 2 medication(s) that are the same as other medications prescribed for you. Read the directions carefully, and ask your doctor or other care provider to review them with you. Where to Get Your Medications Information about where to get these medications is not yet available ! Ask your nurse or doctor about these medications  
  oxyCODONE-acetaminophen 7.5-325 mg per tablet

## 2017-02-10 NOTE — OP NOTES
[facility] 65   OPERATIVE REPORT       Name:  Brian Greer   MR#:  [id number]   :  1940   Account #:  [de-identified]   Date of Adm:  02/10/2017       DATE OF SURGERY: 02/10/2017. PREPROCEDURE DIAGNOSES:    1. Chronic pain syndrome. 2. Chronic neuropathic pain in the extremities. POSTPROCEDURE DIAGNOSES:    1. Chronic pain syndrome. 2. Chronic neuropathic pain in the extremities. PROCEDURE:    1. Bilateral T9 laminectomy for implantation of neurospinal   stimulator electrode paddle, [employer]tronic. 2. Implantation of neurospinal stimulator pulse generator,   Medtronic. 3. Continuous intraoperative fluoroscopy. SURGEON: Cameron Alsa. Clover Lowery MD.    ANESTHESIA: General endotracheal.    ESTIMATED BLOOD LOSS: Minimal.    PREPARATION: ChloraPrep. COMPLICATIONS: None. HISTORY OF PRESENT ILLNESS: A 72-year-old lady with chronic pain   syndrome and chronic neuropathic pain of the extremities. She   underwent greater than 6 months of conservative treatment   without any relief. MRI showed scoliosis and degenerative   change, but no surgical pathology. Spinal cord stimulator trial   was highly beneficial and she now enters the hospital for   permanent implantation. OPERATIVE NOTE: The patient was brought to the operating room   and was carefully placed under general endotracheal anesthesia   without complications, carefully turned prone on the OSI bed on   top of the [place] frame and using AP fluoroscopy, the T9   level was marked bilaterally and a pocket for the battery in the   left side below the belt. The entire back was prepped and draped   in the usual sterile fashion. Two incisions were drawn out in the   thoracic spine and battery pocket region respectively. They were   infiltrated with 1% Xylocaine with epinephrine and incised with   a 15 blade in the thoracic spine.  Muscles, fascia, and soft   tissues were dissected off the lamina of T9 bilaterally with   cautery and elevators and deep retractors were placed. AP   fluoroscopy confirmed the T9-10 level. T9 laminectomy was   carried out with Leksell rongeurs and 3 mm Kerrison rongeur. The   dura was decompressed bilaterally. Bone edges were waxed. The   battery pocket was developed with 15 blade Bovie cautery and an   [employer] retractor dry pocket was developed. The surgical team   changed gloves at this point. The electrode paddle was brought   onto the field and was placed in the midline, encompassing all   of T8 and part of T7 and T9 without any complications. It was   noted to be in the midline. It was secured to the T10 spinous   process with double 0 silk suture through the bone and   protective anchors. Tunneling incision was made to the battery   site to the thoracic spine incision through which the plastic   sheath was left in its tract and the wires were passed under the   subcutaneous tissues to the battery and connected. The battery   was tested. It was functioning well and, therefore, was packed   into the battery pocket with the wires redundantly posteriorly   without any complications and it sat well. Both wounds were   copiously irrigated until clear. Thrombin soaked Gelfoam was   used for hemostasis along with FloSeal and thrombin. Vancomycin   powder was placed for infection prophylaxis and the wounds were   closed. The subcutaneous tissues were closed with interrupted 3-  0 Vicryl. Fascia was closed with 0 Vicryl and skin was closed   with Prineo tape and Dermabond and sterile dressings were   placed. Final x-rays were obtained which confirmed good position   of the electrode paddle. The patient tolerated the procedure   well, was turned supine, awakened, extubated, and taken to PACU   in stable condition. There were no obvious complications.         MD ANDRE Ro / Magalis Douglas   D:  02/10/2017   13:39   T:  02/10/2017   14:02   Job #:  [id number]

## 2017-02-11 VITALS
HEIGHT: 62 IN | BODY MASS INDEX: 20.07 KG/M2 | RESPIRATION RATE: 18 BRPM | WEIGHT: 109.06 LBS | OXYGEN SATURATION: 95 % | DIASTOLIC BLOOD PRESSURE: 57 MMHG | TEMPERATURE: 97.8 F | SYSTOLIC BLOOD PRESSURE: 119 MMHG | HEART RATE: 70 BPM

## 2017-02-11 PROCEDURE — 97166 OT EVAL MOD COMPLEX 45 MIN: CPT

## 2017-02-11 PROCEDURE — 97162 PT EVAL MOD COMPLEX 30 MIN: CPT

## 2017-02-11 PROCEDURE — 74011250636 HC RX REV CODE- 250/636: Performed by: NEUROLOGICAL SURGERY

## 2017-02-11 PROCEDURE — 74011250637 HC RX REV CODE- 250/637: Performed by: NEUROLOGICAL SURGERY

## 2017-02-11 PROCEDURE — 97530 THERAPEUTIC ACTIVITIES: CPT

## 2017-02-11 RX ADMIN — POTASSIUM CHLORIDE 10 MEQ: 750 TABLET, EXTENDED RELEASE ORAL at 14:20

## 2017-02-11 RX ADMIN — LOTEPREDNOL ETABONATE 1 DROP: 5 SUSPENSION/ DROPS OPHTHALMIC at 08:05

## 2017-02-11 RX ADMIN — PANTOPRAZOLE SODIUM 40 MG: 40 TABLET, DELAYED RELEASE ORAL at 05:03

## 2017-02-11 RX ADMIN — OXYCODONE HYDROCHLORIDE AND ACETAMINOPHEN 1 TABLET: 7.5; 325 TABLET ORAL at 07:23

## 2017-02-11 RX ADMIN — OXYCODONE HYDROCHLORIDE AND ACETAMINOPHEN 1 TABLET: 7.5; 325 TABLET ORAL at 03:05

## 2017-02-11 RX ADMIN — VITAMIN D, TAB 1000IU (100/BT) 1000 UNITS: 25 TAB at 14:20

## 2017-02-11 RX ADMIN — OXYCODONE HYDROCHLORIDE AND ACETAMINOPHEN 1 TABLET: 7.5; 325 TABLET ORAL at 14:20

## 2017-02-11 RX ADMIN — Medication 10 ML: at 06:00

## 2017-02-11 RX ADMIN — Medication 400 MG: at 14:20

## 2017-02-11 RX ADMIN — VANCOMYCIN HYDROCHLORIDE 750 MG: 10 INJECTION, POWDER, LYOPHILIZED, FOR SOLUTION INTRAVENOUS at 08:05

## 2017-02-11 NOTE — PROGRESS NOTES
Problem: Mobility Impaired (Adult and Pediatric)  Goal: *Acute Goals and Plan of Care (Insert Text)  Discharge Goals:  (1.)Ms. Shelly Delgado will perform bed mobility via log roll with CGA within 7 day(s). (2.)Ms. Shelly Delgado will transfer from bed to chair and chair to bed with CONTACT GUARD ASSIST using the least restrictive device within 7 day(s). (3.)Ms. Martinez will ambulate with CONTACT GUARD ASSIST for 100+ feet with the least restrictive device within 7 day(s). (4.)Ms. Shelly Delgado will tolerate 25+ minutes of therapeutic activity/exercise while maintaining stable vitals to improve functional strength and mobility within 7 day(s). ________________________________________________________________________________________________      PHYSICAL THERAPY: INITIAL ASSESSMENT, TREATMENT DAY: DAY OF ASSESSMENT, AM    2/11/2017  INPATIENT: Hospital Day: 2  [place] / [place] / Product Type: Medicare /       Spinal Precautions     NAME/AGE/GENDER: Precious Vazquez is a 68 y.o. female    PRIMARY DIAGNOSIS: Chronic pain syndrome [G89.4] Chronic pain disorder Chronic pain disorder  Procedure(s) (LRB):  SPINAL CORD STIMULATOR INSERTION (N/A)  1 Day Post-Op  ICD-10: Treatment Diagnosis:       · Generalized Muscle Weakness (M62.81)  · Difficulty in walking, Not elsewhere classified (R26.2)  · Low Back Pain (M54.5)   Precaution/Allergies:  Lortab [hydrocodone-acetaminophen] and Lunesta [eszopiclone]       ASSESSMENT:      Ms. Shelly Delgado presents is a 68year old female 1 day s/p T9 bilaterally laminectomy for spinal cord stimulator insertion with spinal precautions. Patient seen this AM for initial physical therapy evaluation: presents sitting up in bedside chair, oriented x4, and endorses 8/10 low back pain. Patient lives with her spouse in a single story residence with 5 steps to enter. At baseline, has been ambulating with a rollator at home and  assists with ADLs and stair negotiaiton.  Patient endorses recent falls and B LE weakness R>L PTA. Endorses decreased outpatient physical therapy tolerance secondary to pain. Today, L LE strength functionally 4-/5, R LE strength functionally 3+/5, and R dorsiflexion 3-/5, and sensation is intact to light touch C4-T1 and L3-S1 B. Patient performed transfers and ambulation x7ft with RW and minimal assistance. Demonstrated kyphotic posture with trunk flexion and a slow, shuffling gait pattern with fair- dynamic balance throughout. Endorsed increasing dizziness and nausea in standing, quickly returned to sitting and vitals assessed: BP: 127/47 HR: 78bpm. Rested in chair x2 minutes and vitals reassessed: BP: 99/44 HR: 73bpm and patient continues to be symptomatic. Transitioned back into supine with minimal assistance and BP recovered: 108/62 HR: 73bpm and symptoms resolved. RN notified. Ms. Paulo Workman presents with decreased functional mobility and balance/gait status from baseline. Recommend continued skilled PT services to address stated deficits. Will follow with DAILY plan of care and progress toward stated goals during acute stay. This section established at most recent assessment   PROBLEM LIST (Impairments causing functional limitations):  1. Decreased Strength  2. Decreased ADL/Functional Activities  3. Decreased Transfer Abilities  4. Decreased Ambulation Ability/Technique  5. Decreased Balance  6. Increased Pain  7. Decreased Activity Tolerance  8. Decreased Knowledge of Precautions  9. Decreased [place] with Home Exercise Program    INTERVENTIONS PLANNED: (Benefits and precautions of physical therapy have been discussed with the patient.)  1. Balance Exercise  2. Bed Mobility  3. Family Education  4. Gait Training  5. Home Exercise Program (HEP)  6. Neuromuscular Re-education/Strengthening  7. Range of Motion (ROM)  8. Therapeutic Activites  9. Therapeutic Exercise/Strengthening  10. Transfer Training  11.  Group Therapy      TREATMENT PLAN: Frequency/Duration: daily for duration of hospital stay  Rehabilitation Potential For Stated Goals: GOOD      RECOMMENDED REHABILITATION/EQUIPMENT: (at time of discharge pending progress): Continue Skilled Therapy. HISTORY:   History of Present Injury/Illness (Reason for Referral):  S/p spinal cord stimulator, B LE weakness  Past Medical History/Comorbidities:   Ms. Tony Lee  has a past medical history of Blood loss anemia; CAD (coronary artery disease) (01/2014); Cervical myelopathy; Cervical spinal stenosis; Chronic lower back pain; GERD (gastroesophageal reflux disease); H/O methicillin resistant Staphylococcus aureus infection; History of staph infection (2012); Insomnia; Lumbar radiculopathy, acute; Lumbar stenosis without neurogenic claudication; Mixed hyperlipidemia; Nausea & vomiting; Other intervertebral disc degeneration, lumbar region; Primary insomnia; PUD (peptic ulcer disease); Scoliosis, idiopathic, infantile; and Spinal stenosis, cervical region. She also has no past medical history of Unspecified adverse effect of anesthesia. Ms. Tony Lee  has a past surgical history that includes lap cholecystectomy; appendectomy; bladder repair (2010); hysterectomy (1972); oophorectomy (1999); orthopaedic (01/2015); colonoscopy (2014); endoscopy (10/2015); angioplasty; tumor removal (at age of 40); cervical fusion (07/2016); and cataract removal (Bilateral). Social History/Living Environment:   Home Environment: Private residence  # Steps to Enter: 5  One/Two Story Residence: One story  Lift Chair Available: No  Living Alone: No  Support Systems: Spouse/Significant Other/Partner  Patient Expects to be Discharged to[de-identified] Private residence  Current DME Used/Available at Home: Glucometer, Blood pressure cuff, Walker  Prior Level of Function/Work/Activity:  At baseline, has been ambulating with a rollator at home and  assists with ADLs and stair negotiaiton.  Patient endorses recent falls and B LE weakness R>L PTA. Endorses decreased outpatient physical therapy tolerance secondary to pain. Number of Personal Factors/Comorbidities that affect the Plan of Care: 1-2: MODERATE COMPLEXITY   EXAMINATION:   Most Recent Physical Functioning:   Gross Assessment:  AROM: Generally decreased, functional (R dorsiflexion)  Strength: Generally decreased, functional (L LE functionally 4-/5; R LE 3+/5, R dorsiflexion 3-/5)  Coordination: Generally decreased, functional (B LE R>L)  Sensation: Intact (Light touch C4-T1 and L3-S1 B)               Posture:  Posture (WDL): Exceptions to WDL  Posture Assessment: Forward head, Kyphosis, Trunk flexion  Balance:  Sitting: Intact  Sitting - Static: Good (unsupported)  Sitting - Dynamic: Good (unsupported)  Standing: Impaired  Standing - Static: Fair  Standing - Dynamic : Fair (-) Bed Mobility:  Sit to Supine: Minimum assistance  Scooting: Contact guard assistance  Wheelchair Mobility:     Transfers:  Sit to Stand: Minimum assistance  Stand to Sit: Minimum assistance  Bed to Chair: Minimum assistance  Gait:     Base of Support: Narrowed; Center of gravity altered  Speed/Ann: Slow;Shuffled  Step Length: Right shortened;Left shortened  Swing Pattern: Right asymmetrical  Gait Abnormalities: Decreased step clearance;Shuffling gait; Foot drop;Trunk sway increased; Step to gait  Distance (ft): 7 Feet (ft)  Assistive Device: Walker, rolling  Ambulation - Level of Assistance: Minimal assistance  Interventions: Safety awareness training; Tactile cues; Verbal cues       Body Structures Involved:  1. Bones  2. Joints  3. Muscles  4. Ligaments Body Functions Affected:  1. Neuromusculoskeletal  2. Movement Related Activities and Participation Affected:  1. General Tasks and Demands  2. Mobility  3. Self Care  4.  Domestic Life   Number of elements that affect the Plan of Care: 4+: HIGH COMPLEXITY   CLINICAL PRESENTATION:   Presentation: Evolving clinical presentation with changing clinical characteristics: MODERATE COMPLEXITY   CLINICAL DECISION MAKIN[address] [place] [id number] AM-PAC 6 Clicks   Basic Mobility Inpatient Short Form  How much difficulty does the patient currently have. .. Unable A Lot A Little None   1. Turning over in bed (including adjusting bedclothes, sheets and blankets)? [ ] 1   [ ] 2   [X] 3   [ ] 4   2. Sitting down on and standing up from a chair with arms ( e.g., wheelchair, bedside commode, etc.)   [ ] 1   [ ] 2   [X] 3   [ ] 4   3. Moving from lying on back to sitting on the side of the bed? [ ] 1   [ ] 2   [X] 3   [ ] 4   How much help from another person does the patient currently need. .. Total A Lot A Little None   4. Moving to and from a bed to a chair (including a wheelchair)? [ ] 1   [ ] 2   [X] 3   [ ] 4   5. Need to walk in hospital room? [ ] 1   [ ] 2   [X] 3   [ ] 4   6. Climbing 3-5 steps with a railing? [ ] 1   [X] 2   [ ] 3   [ ] 4   © , Trustees of [address] [place] [id number], under license to [employer]. All rights reserved    Score:  Initial: 17 Most Recent: 17 (Date: 17 )     Interpretation of Tool:  Represents activities that are increasingly more difficult (i.e. Bed mobility, Transfers, Gait). Score 24 23 22-20 19-15 14-10 9-7 6       Modifier CH CI CJ CK CL CM CN         · Mobility - Walking and Moving Around:               - CURRENT STATUS:    CK - 40%-59% impaired, limited or restricted               - GOAL STATUS:           CJ - 20%-39% impaired, limited or restricted               - D/C STATUS:                       ---------------To be determined---------------  [place] / [place] / Product Type: Medicare /       Medical Necessity:     · Patient demonstrates good rehab potential due to higher previous functional level. Reason for Services/Other Comments:  · Patient continues to require skilled intervention due to decreased functional mobility and balance/gait status from baseline. Janiya Angelo Use of outcome tool(s) and clinical judgement create a POC that gives a: Questionable prediction of patient's progress: MODERATE COMPLEXITY                 TREATMENT:       Pre-treatment Symptoms/Complaints:    Pain: Initial:   Pain Intensity 1: 8  Pain Location 1: Back  Pain Orientation 1: Lower  Pain Intervention(s) 1: Ambulation/Increased Activity, Emotional support, Rest, Repositioned  Post Session:  Comfortable in bed; pain unchanged. See objective for vitals. Initial Evaluation  Therapeutic Activity: (    8 Minutes): Therapeutic activities including bed mobility, transfer training, balance training, safety awareness training, ambulation on level ground x7ft, and patient education to improve mobility, strength, balance and coordination. Required minimal Safety awareness training; Tactile cues; Verbal cues to promote static and dynamic balance in standing and promote coordination of bilateral, lower extremity(s). Braces/Orthotics/Lines/Etc:   · IV  · O2 Device: Nasal cannula  Treatment/Session Assessment:    · Response to Treatment:  See above. · Interdisciplinary Collaboration:  · Physical Therapist  · Registered Nurse  · After treatment position/precautions:  · Supine in bed  · Bed alarm/tab alert on  · Bed/Chair-wheels locked  · Bed in low position  · Call light within reach  · RN notified  · Family at bedside  · patient in NAD  · Compliance with Program/Exercises: Will assess as treatment progresses. · Recommendations/Intent for next treatment session: \"Next visit will focus on advancements to more challenging activities and reduction in assistance provided\".   Total Treatment Duration:  PT Patient Time In/Time Out  Time In: 0915  Time Out: Alessandro 80, DPT

## 2017-02-11 NOTE — PROGRESS NOTES
Problem: Self Care Deficits Care Plan (Adult)  Goal: *Acute Goals and Plan of Care (Insert Text)  1. Patient will verbalize and demonstrate understanding of spinal precautions with 100% accuracy during ADLs. 2. Patient will complete lower body bathing and dressing with minimal assistance and adaptive equipment as needed. 3. Patient will complete functional transfers with supervision and adaptive equipment as needed. 4. Patient will complete toileting and toilet transfer with supervision. 5. Patient will complete functional mobility of household distances with supervision and adaptive equipment as needed. 6. Patient will demonstrate ability to log roll in bed with modified independence and no verbal cues from therapist.     Timeframe: 7 visits       OCCUPATIONAL THERAPY: Initial Assessment 2/11/2017  INPATIENT: Hospital Day: 2  [place] / [place] / Product Type: Medicare /      NAME/AGE/GENDER: Phillip Contreras is a 68 y.o. female    PRIMARY DIAGNOSIS:  Chronic pain syndrome [G89.4] Chronic pain disorder Chronic pain disorder  Procedure(s) (LRB):  SPINAL CORD STIMULATOR INSERTION (N/A)  1 Day Post-Op  ICD-10: Treatment Diagnosis:        · Low Back Pain (M54.5)  · Abnormal posture (R29.3)   Precautions/Allergies:        spinal precautions  Lortab [hydrocodone-acetaminophen] and Lunesta [eszopiclone]       ASSESSMENT:      Ms. Abiola Zepeda is a 68year old female who is now s/p above procedure spinal cord stimulator insertion. Patient lives with spouse at baseline and is typically independent with toileting and feeding. She gets assistance with shower transfers, bathing, and dressing. She admits to falls at home. She uses a rollator at all times. Patient does not drive. Patient supine in bed upon arrival, agreeable to OT evaluation. Reports no pain at rest. Educated patient on spinal precautions and log roll technique.  Able to complete with minimal assistance and stand with minimal assistance. Required rolling walker and minimal assistance to take steps to chair. Patient has kyphotic forward flexed posture from previous injury. Patient is currently functioning below her baseline and would benefit from continued OT to increase independence and safety. This section established at most recent assessment   PROBLEM LIST (Impairments causing functional limitations):  1. Decreased Strength  2. Decreased ADL/Functional Activities  3. Decreased Transfer Abilities  4. Decreased Ambulation Ability/Technique  5. Decreased Balance  6. Increased Pain  7. Decreased Activity Tolerance  8. Decreased Flexibility/Joint Mobility  9. Decreased Knowledge of Precautions    INTERVENTIONS PLANNED: (Benefits and precautions of occupational therapy have been discussed with the patient.)  1. Activities of daily living training  2. Adaptive equipment training  3. Group therapy  4. Theraputic activity  5. Theraputic exercise      TREATMENT PLAN: Frequency/Duration: Follow patient 3x/ week to address above goals. Rehabilitation Potential For Stated Goals: GOOD      RECOMMENDED REHABILITATION/EQUIPMENT: (at time of discharge pending progress): Continue Skilled Therapy. OCCUPATIONAL PROFILE AND HISTORY:   History of Present Injury/Illness (Reason for Referral):  S/p above procedure   Past Medical History/Comorbidities:   Ms. Yazmin Claudio  has a past medical history of Blood loss anemia; CAD (coronary artery disease) (01/2014); Cervical myelopathy; Cervical spinal stenosis; Chronic lower back pain; GERD (gastroesophageal reflux disease); H/O methicillin resistant Staphylococcus aureus infection; History of staph infection (2012); Insomnia; Lumbar radiculopathy, acute; Lumbar stenosis without neurogenic claudication; Mixed hyperlipidemia; Nausea & vomiting; Other intervertebral disc degeneration, lumbar region; Primary insomnia; PUD (peptic ulcer disease);  Scoliosis, idiopathic, infantile; and Spinal stenosis, cervical region. She also has no past medical history of Unspecified adverse effect of anesthesia. Ms. Trupti Coleman  has a past surgical history that includes lap cholecystectomy; appendectomy; bladder repair (2010); hysterectomy (1972); oophorectomy (1999); orthopaedic (01/2015); colonoscopy (2014); endoscopy (10/2015); angioplasty; tumor removal (at age of 40); cervical fusion (07/2016); and cataract removal (Bilateral). Social History/Living Environment:   Home Environment: Private residence  # Steps to Enter: 5  One/Two Story Residence: One story  Lift Chair Available: No  Living Alone: No  Support Systems: Spouse/Significant Other/Partner  Patient Expects to be Discharged to[de-identified] Private residence  Current DME Used/Available at Home: Glucometer, Blood pressure cuff, Walker  Prior Level of Function/Work/Activity:  Patient lives with spouse. Gets assistance with shower transfers, bathing, and dressing. Has had falls at home. Number of Personal Factors/Comorbidities that affect the Plan of Care: Expanded review of therapy/medical records (1-2):  MODERATE COMPLEXITY   ASSESSMENT OF OCCUPATIONAL PERFORMANCE[de-identified]   Activities of Daily Living:           Basic ADLs (From Assessment) Complex ADLs (From Assessment)   Basic ADL  Feeding: Setup  Oral Facial Hygiene/Grooming: Setup  Bathing: Moderate assistance  Upper Body Dressing: Minimum assistance  Lower Body Dressing:  Moderate assistance  Toileting: Minimum assistance Instrumental ADL  Meal Preparation: Maximum assistance  Homemaking: Maximum assistance   Grooming/Bathing/Dressing Activities of Daily Living     Cognitive Retraining  Safety/Judgement: Fall prevention                       Bed/Mat Mobility  Supine to Sit: Minimum assistance  Sit to Supine: Minimum assistance  Sit to Stand: Minimum assistance  Bed to Chair: Minimum assistance  Scooting: Contact guard assistance          Most Recent Physical Functioning:   Gross Assessment:  AROM: Generally decreased, functional (BUE)  Strength: Generally decreased, functional (BUE)  Coordination: Generally decreased, functional (BUE limited by athritis)               Posture:  Posture (WDL): Exceptions to WDL  Posture Assessment: Forward head, Kyphosis, Trunk flexion  Balance:  Sitting: Intact  Sitting - Static: Good (unsupported)  Sitting - Dynamic: Good (unsupported)  Standing: Impaired  Standing - Static: Fair  Standing - Dynamic : Fair (-) Bed Mobility:  Supine to Sit: Minimum assistance  Sit to Supine: Minimum assistance  Scooting: Contact guard assistance  Wheelchair Mobility:     Transfers:  Sit to Stand: Minimum assistance  Stand to Sit: Minimum assistance  Bed to Chair: Minimum assistance                 Patient Vitals for the past 6 hrs:       BP BP Patient Position SpO2 Pulse   17 1130 105/44 At rest 92 % 66   17 1421 119/57 At rest 95 % 70        Mental Status  Neurologic State: Alert  Orientation Level: Oriented X4  Cognition: Follows commands  Perception: Appears intact  Perseveration: No perseveration noted  Safety/Judgement: Fall prevention                               Physical Skills Involved:  1. Range of Motion  2. Balance  3. Mobility  4. Strength  5. Fine or Gross Motor Coordination Cognitive Skills Affected (resulting in the inability to perform in a timely and safe manner):  1. Remembering Psychosocial Skills Affected:  1. Environmental Adaptations   Number of elements that affect the Plan of Care: 5+:  HIGH COMPLEXITY   CLINICAL DECISION MAKIN [facility] Inpatient Short Form  How much help from another person does the patient currently need. .. Total A Lot A Little None   1. Putting on and taking off regular lower body clothing?   [ ] 1   [X] 2   [ ] 3   [ ] 4   2. Bathing (including washing, rinsing, drying)? [ ] 1   [X] 2   [ ] 3   [ ] 4   3.   Toileting, which includes using toilet, bedpan or urinal?   [ ] 1   [X] 2   [ ] 3   [ ] 4   4.  Putting on and taking off regular upper body clothing?   [ ] 1   [ ] 2   [X] 3   [ ] 4   5. Taking care of personal grooming such as brushing teeth? [ ] 1   [ ] 2   [X] 3   [ ] 4   6. Eating meals? [ ] 1   [ ] 2   [X] 3   [ ] 4   © 2007, Trustees of [address] [place] [address] [address] [id number], under license to [employer]. All rights reserved    Score:  Initial: 15 Most Recent: X (Date: -- )     Interpretation of Tool:  Represents activities that are increasingly more difficult (i.e. Bed mobility, Transfers, Gait). Score 24 23 22-20 19-15 14-10 9-7 6       Modifier CH CI CJ CK CL CM CN         · Self Care:               - CURRENT STATUS:    CK - 40%-59% impaired, limited or restricted               - GOAL STATUS:           CJ - 20%-39% impaired, limited or restricted               - D/C STATUS:                       ---------------To be determined---------------  [place] / [place] / Product Type: Medicare /       Medical Necessity:     · Patient demonstrates good rehab potential due to higher previous functional level. Reason for Services/Other Comments:  · Patient continues to require present interventions due to patient's inability to care for self while maintaining spinal precautions.    Use of outcome tool(s) and clinical judgement create a POC that gives a: MODERATE COMPLEXITY             TREATMENT:   (In addition to Assessment/Re-Assessment sessions the following treatments were rendered)      Pre-treatment Symptoms/Complaints:  none  Pain: Initial:   Pain Intensity 1: 0  Post Session:  same         Braces/Orthotics/Lines/Etc:   · O2 Device: Nasal cannula  Treatment/Session Assessment:    · Response to Treatment:  Tolerated well  · Interdisciplinary Collaboration:  · Occupational Therapist  · Registered Nurse  · After treatment position/precautions:  · Up in chair  · Bed/Chair-wheels locked  · Call light within reach  · RN notified  · Family at bedside  · Compliance with Program/Exercises: Will assess as treatment progresses. · Recommendations/Intent for next treatment session: \"Next visit will focus on advancements to more challenging activities and reduction in assistance provided\".   Total Treatment Duration:  OT Patient Time In/Time Out  Time In: 0838  Time Out: [address] OTR/L

## 2017-02-11 NOTE — PROGRESS NOTES
Reviewed all discharge instructions at length with patient and . Prescriptions and personal belongings given to patient. Allowed to ask questions. No concerns noted.

## 2017-02-11 NOTE — DISCHARGE INSTRUCTIONS
NO showers/baths-->SPONGE BATH ONLY    CHANGE dressing DAILY--sponge bath-->remove old dressing-->apply fresh dressing    ACTIVITY as tolerated    MAY drive as directed    Avoid having pets sleep in bed with you until incision is completely healed    CALL DR. Steele:  Fever >100.5                                   Incision becomes red, swollen or opens up             Incision has yellow, thick drainage or an odor             Pain is not managed with prescribed medications             Excessive nausea and/or vomiting    -->If you have a question about how to use the stimulator please call the Rep for the stimulator

## 2017-02-12 ENCOUNTER — PATIENT OUTREACH (OUTPATIENT)
Dept: CASE MANAGEMENT | Age: 77
End: 2017-02-12

## 2017-02-12 NOTE — PROGRESS NOTES
Transition of Care Discharge Follow-up Questionnaire   Date/Time of Call:   2/12/17 1:11p   What was the patient hospitalized for? Chronic pain disorder   Does the patient understand his/her diagnosis and/or treatment and what happened during the hospitalization? Patients spouse verbalized understanding of treatment and diagnosis. Did the patient receive discharge instructions? Yes   Review any discharge instructions (see notes in [facility]). Ask patient if they understand these. Do they have any questions? He verbalized understanding of instructions. Were home services ordered (nursing, PT, OT, ST, etc.)? No   If so, has the first visit occurred? If not, why? (Assist with coordination of services if necessary.) n/a   Was any DME ordered? No, patient has a rolling walker   If so, has it been received? If not, why?  (Assist with coordination of arranging DME orders if necessary.) n/a   Complete a review of all medications (new, continued and discontinued meds per the D/C instructions and medication tab in [facility]). Patient states medications are up to date per chart review. Were all new prescriptions filled? If not, why?  (Assist with obtainment of medications if necessary.)  Yes, he picked up medications yesterday. Does the patient understand the purpose and dosing instructions for all medications? (If patient has questions, provide explanation and education.) Spouse indicates understanding of purpose and dosing instructions for all medications. Does the patient have any problems in performing ADLs? (If patient is unable to perform ADLs  what is the limiting factor(s)? Do they have a support system that can assist? If no support system is present, discuss possible assistance that they may be able to obtain.) Spouse states patient uses a rolling walker for assistance with mobility. He indicates patient is able to feed and dress herself, but he provides stand-by assistance. Patient always has a caregiver with her in the house for assistance if needed. Does the patient have all follow-up appointments scheduled? Has transportation been arranged? [facility] follow-up should be within 7 days of discharge; all others should have PCP follow-up within 7 days of discharge; follow-ups with other specialists as appropriate or ordered.) Patient will keep appointment with Dr. Allegra Nieves, Pain Specialist. He will get appointment with Dr. Patrick Aden scheduled on Monday. Any other questions or concerns expressed by the patient? No other questions or concerns were voiced by patients spouse to care coordinator. He was thankful for call. [facility] Call Completed By: Aileen Castillo LPN  [facility] [address] Coordinator          This note will not be viewable in [address]

## 2017-03-23 ENCOUNTER — HOSPITAL ENCOUNTER (INPATIENT)
Age: 77
LOS: 1 days | Discharge: HOME HEALTH | DRG: 057 | End: 2017-03-26
Attending: EMERGENCY MEDICINE | Admitting: INTERNAL MEDICINE
Payer: MEDICARE

## 2017-03-23 ENCOUNTER — APPOINTMENT (OUTPATIENT)
Dept: CT IMAGING | Age: 77
DRG: 057 | End: 2017-03-23
Attending: EMERGENCY MEDICINE
Payer: MEDICARE

## 2017-03-23 ENCOUNTER — APPOINTMENT (OUTPATIENT)
Dept: CT IMAGING | Age: 77
DRG: 057 | End: 2017-03-23
Payer: MEDICARE

## 2017-03-23 DIAGNOSIS — I63.9 CEREBROVASCULAR ACCIDENT (CVA), UNSPECIFIED MECHANISM (HCC): Primary | ICD-10-CM

## 2017-03-23 DIAGNOSIS — G81.91 RIGHT HEMIPLEGIA (HCC): ICD-10-CM

## 2017-03-23 PROBLEM — R53.1 RIGHT SIDED WEAKNESS: Status: ACTIVE | Noted: 2017-03-23

## 2017-03-23 PROBLEM — R13.10 DYSPHAGIA: Status: ACTIVE | Noted: 2017-03-23

## 2017-03-23 LAB
ALBUMIN SERPL BCP-MCNC: 3.8 G/DL (ref 3.2–4.6)
ALBUMIN/GLOB SERPL: 1.1 {RATIO} (ref 1.2–3.5)
ALP SERPL-CCNC: 76 U/L (ref 50–136)
ALT SERPL-CCNC: 23 U/L (ref 12–65)
AMPHET UR QL SCN: NEGATIVE
ANION GAP BLD CALC-SCNC: 6 MMOL/L (ref 7–16)
APPEARANCE UR: CLEAR
AST SERPL W P-5'-P-CCNC: 24 U/L (ref 15–37)
BARBITURATES UR QL SCN: NEGATIVE
BASOPHILS # BLD AUTO: 0 K/UL (ref 0–0.2)
BASOPHILS # BLD: 0 % (ref 0–2)
BENZODIAZ UR QL: NEGATIVE
BILIRUB SERPL-MCNC: 0.3 MG/DL (ref 0.2–1.1)
BILIRUB UR QL: NEGATIVE
BUN SERPL-MCNC: 21 MG/DL (ref 8–23)
CALCIUM SERPL-MCNC: 9.2 MG/DL (ref 8.3–10.4)
CANNABINOIDS UR QL SCN: NEGATIVE
CHLORIDE SERPL-SCNC: 104 MMOL/L (ref 98–107)
CO2 SERPL-SCNC: 29 MMOL/L (ref 21–32)
COCAINE UR QL SCN: NEGATIVE
COLOR UR: YELLOW
CREAT SERPL-MCNC: 0.67 MG/DL (ref 0.6–1)
DIFFERENTIAL METHOD BLD: ABNORMAL
EOSINOPHIL # BLD: 0.1 K/UL (ref 0–0.8)
EOSINOPHIL NFR BLD: 2 % (ref 0.5–7.8)
ERYTHROCYTE [DISTWIDTH] IN BLOOD BY AUTOMATED COUNT: 12.6 % (ref 11.9–14.6)
GLOBULIN SER CALC-MCNC: 3.5 G/DL (ref 2.3–3.5)
GLUCOSE SERPL-MCNC: 98 MG/DL (ref 65–100)
GLUCOSE UR STRIP.AUTO-MCNC: NEGATIVE MG/DL
HCT VFR BLD AUTO: 38.9 % (ref 35.8–46.3)
HGB BLD-MCNC: 12.8 G/DL (ref 11.7–15.4)
HGB UR QL STRIP: NEGATIVE
IMM GRANULOCYTES # BLD: 0 K/UL (ref 0–0.5)
IMM GRANULOCYTES NFR BLD AUTO: 0.2 % (ref 0–5)
KETONES UR QL STRIP.AUTO: NEGATIVE MG/DL
LEUKOCYTE ESTERASE UR QL STRIP.AUTO: NEGATIVE
LYMPHOCYTES # BLD AUTO: 36 % (ref 13–44)
LYMPHOCYTES # BLD: 2.1 K/UL (ref 0.5–4.6)
MCH RBC QN AUTO: 30.5 PG (ref 26.1–32.9)
MCHC RBC AUTO-ENTMCNC: 32.9 G/DL (ref 31.4–35)
MCV RBC AUTO: 92.6 FL (ref 79.6–97.8)
METHADONE UR QL: NEGATIVE
MONOCYTES # BLD: 0.2 K/UL (ref 0.1–1.3)
MONOCYTES NFR BLD AUTO: 3 % (ref 4–12)
NEUTS SEG # BLD: 3.5 K/UL (ref 1.7–8.2)
NEUTS SEG NFR BLD AUTO: 59 % (ref 43–78)
NITRITE UR QL STRIP.AUTO: NEGATIVE
OPIATES UR QL: NEGATIVE
PCP UR QL: NEGATIVE
PH UR STRIP: 6 [PH] (ref 5–9)
PLATELET # BLD AUTO: 206 K/UL (ref 150–450)
PMV BLD AUTO: 11.9 FL (ref 10.8–14.1)
POTASSIUM SERPL-SCNC: 4.3 MMOL/L (ref 3.5–5.1)
PROT SERPL-MCNC: 7.3 G/DL (ref 6.3–8.2)
PROT UR STRIP-MCNC: NEGATIVE MG/DL
RBC # BLD AUTO: 4.2 M/UL (ref 4.05–5.25)
SODIUM SERPL-SCNC: 139 MMOL/L (ref 136–145)
SP GR UR REFRACTOMETRY: 1.01 (ref 1–1.02)
UROBILINOGEN UR QL STRIP.AUTO: 0.2 EU/DL (ref 0.2–1)
WBC # BLD AUTO: 5.9 K/UL (ref 4.3–11.1)

## 2017-03-23 PROCEDURE — 99218 HC RM OBSERVATION: CPT

## 2017-03-23 PROCEDURE — 74011250636 HC RX REV CODE- 250/636: Performed by: HOSPITALIST

## 2017-03-23 PROCEDURE — 86580 TB INTRADERMAL TEST: CPT

## 2017-03-23 PROCEDURE — 81003 URINALYSIS AUTO W/O SCOPE: CPT | Performed by: HOSPITALIST

## 2017-03-23 PROCEDURE — 80320 DRUG SCREEN QUANTALCOHOLS: CPT | Performed by: HOSPITALIST

## 2017-03-23 PROCEDURE — 70450 CT HEAD/BRAIN W/O DYE: CPT

## 2017-03-23 PROCEDURE — 80307 DRUG TEST PRSMV CHEM ANLYZR: CPT | Performed by: HOSPITALIST

## 2017-03-23 PROCEDURE — 85025 COMPLETE CBC W/AUTO DIFF WBC: CPT | Performed by: EMERGENCY MEDICINE

## 2017-03-23 PROCEDURE — 74011000302 HC RX REV CODE- 302

## 2017-03-23 PROCEDURE — 99284 EMERGENCY DEPT VISIT MOD MDM: CPT | Performed by: EMERGENCY MEDICINE

## 2017-03-23 PROCEDURE — 74011250637 HC RX REV CODE- 250/637

## 2017-03-23 PROCEDURE — 80053 COMPREHEN METABOLIC PANEL: CPT | Performed by: EMERGENCY MEDICINE

## 2017-03-23 PROCEDURE — 36415 COLL VENOUS BLD VENIPUNCTURE: CPT | Performed by: HOSPITALIST

## 2017-03-23 RX ORDER — SODIUM CHLORIDE 0.9 % (FLUSH) 0.9 %
5-10 SYRINGE (ML) INJECTION EVERY 8 HOURS
Status: DISCONTINUED | OUTPATIENT
Start: 2017-03-23 | End: 2017-03-26 | Stop reason: HOSPADM

## 2017-03-23 RX ORDER — SODIUM CHLORIDE 9 MG/ML
75 INJECTION, SOLUTION INTRAVENOUS CONTINUOUS
Status: DISPENSED | OUTPATIENT
Start: 2017-03-23 | End: 2017-03-24

## 2017-03-23 RX ORDER — PANTOPRAZOLE SODIUM 40 MG/1
40 TABLET, DELAYED RELEASE ORAL
Status: DISCONTINUED | OUTPATIENT
Start: 2017-03-23 | End: 2017-03-26 | Stop reason: HOSPADM

## 2017-03-23 RX ORDER — SIMVASTATIN 40 MG/1
40 TABLET, FILM COATED ORAL
Status: DISCONTINUED | OUTPATIENT
Start: 2017-03-23 | End: 2017-03-26 | Stop reason: HOSPADM

## 2017-03-23 RX ORDER — SODIUM CHLORIDE 0.9 % (FLUSH) 0.9 %
5-10 SYRINGE (ML) INJECTION AS NEEDED
Status: DISCONTINUED | OUTPATIENT
Start: 2017-03-23 | End: 2017-03-26 | Stop reason: HOSPADM

## 2017-03-23 RX ORDER — MIRTAZAPINE 15 MG/1
15 TABLET, FILM COATED ORAL
Status: DISCONTINUED | OUTPATIENT
Start: 2017-03-23 | End: 2017-03-26 | Stop reason: HOSPADM

## 2017-03-23 RX ORDER — POLYETHYLENE GLYCOL 3350 17 G/17G
17 POWDER, FOR SOLUTION ORAL EVERY OTHER DAY
COMMUNITY

## 2017-03-23 RX ORDER — GUAIFENESIN 100 MG/5ML
81 LIQUID (ML) ORAL
Status: DISCONTINUED | OUTPATIENT
Start: 2017-03-23 | End: 2017-03-26 | Stop reason: HOSPADM

## 2017-03-23 RX ADMIN — TUBERCULIN PURIFIED PROTEIN DERIVATIVE 5 UNITS: 5 INJECTION INTRADERMAL at 18:23

## 2017-03-23 RX ADMIN — ASPIRIN 81 MG: 81 TABLET, CHEWABLE ORAL at 22:48

## 2017-03-23 RX ADMIN — MIRTAZAPINE 15 MG: 15 TABLET, FILM COATED ORAL at 22:48

## 2017-03-23 RX ADMIN — SODIUM CHLORIDE 75 ML/HR: 900 INJECTION, SOLUTION INTRAVENOUS at 22:48

## 2017-03-23 RX ADMIN — PANTOPRAZOLE SODIUM 40 MG: 40 TABLET, DELAYED RELEASE ORAL at 18:17

## 2017-03-23 RX ADMIN — SIMVASTATIN 40 MG: 40 TABLET, FILM COATED ORAL at 22:48

## 2017-03-23 RX ADMIN — Medication 10 ML: at 18:18

## 2017-03-23 NOTE — ED TRIAGE NOTES
Pt. Complains of right leg and right arm weakness x 2 weeks. States she believes she had a stroke and awaiting a neurology appointment. Pt. States \"We have tried every neurologist\" and states she does not have an appointment until may and cannot function.

## 2017-03-23 NOTE — CONSULTS
[address]       Name:  Bria Guerra   MR#:  [id number]   :  1940   Account #:  [de-identified]   Date of Adm:  2017       HISTORY OF PRESENT ILLNESS: A 68-year-old female admitted with   history of worsening weakness throughout; however, it has been a   bit more prominent in the right upper and lower extremity for   the past several weeks. The patient has not noted muscle   cramping. She has greater weakness distally than proximally in   the upper extremities and fairly diffusely weak in the lower   extremities. Not noted changes in bladder function. Cognitively she   has been quite intact and has had no sensory symptoms. The   patient has a fairly complex past medical history that includes   cervical spinal stenosis and cervical myelopathy treated   surgically. She has chronic low back discomfort, spinal   stenosis, has a recent spinal cord stimulator implantation. During   the last several months, she has lost substantial amounts of   weight has had some difficulty swallowing. Denies nasal   regurgitation, but her voice has been somewhat feeble and she   also has a rather weak cough. She has had no night sweats, fever   or chills. She has no history of visual loss, diplopia,   oscillopsia. The patient was admitted here today, has had a   normal unenhanced CT of brain. PAST MEDICAL HISTORY: Includes anemia, coronary artery disease. The patient had an angioplasty in the past in ,   gastroesophageal reflux. She has history of MRSA infection,   mixed dyslipidemia as well as other entities listed on the   medical record. EXAMINATION: The patient is alert, pleasant, cooperative,   remembers seeing me a couple of years ago for a second opinion   about Parkinson disease. She has full visual fields, normal   ocular motility. No asymmetries in facial sensation. Purses her   lips with some mild weakness. Jaw opens in the midline.  Neck   flexors and extensors are 4+/5, sternocleidomastoid 5/5,   deltoids are 4-, biceps 4, triceps 4- on the right and 4+ on the   left, wrist extensors are 4 bilaterally, intrinsic hand muscles   4-. In the lower extremities, iliopsoas is 3+ on the right and 4-   on the left, quadriceps 4+, hamstrings 4+. Dorsiflexion of the   ankles and toes 5-. The patient has intact sensation. No   cerebellar dysmetria. Reflexes are quite brisk with bilateral   crossed adductors, finger flexor jerks in spite of marked   wasting and intrinsic hand muscles are intact at 2-3+. Fasciculations   are noted in both upper extremities. I do not see fasciculations   of the tongue. IMPRESSION: Insidiously progressive weakness, slightly   asymmetric with wasting and fasciculations in the upper   extremities, history of cervical myelopathy treated surgically. The patient needs reimaging of the neck. I do not think a plain   CT of the cervical spine is adequate and I would recommend a CT   myelogram. The patient will need electrodiagnostic studies to   further investigate the muscle atrophy, weakness and   fasciculations. Would also pursue a swallowing study to further   investigate her weight loss. We will follow along with you in   the hospital.        Sherren Byars.  MD TARYN Vasquez / VIOLETA   D:  03/23/2017   18:24   T:  03/23/2017   19:42   Job #:  [id number]

## 2017-03-23 NOTE — H&P
HOSPITALIST H&P  NAME:  Sulema Higgins   Age:  68 y.o.  :   1940   MRN:   [id number]  PCP: Sam Downs MD  Treatment Team: Attending Provider: Jacque Priest MD; Primary Nurse: Alfie Hernandez  HPI:   Sulema Higgins is a 68 y.o. female that presented to the ED with 2 week history of right sided weakness. She also reports dysphagia associated with 15 pound weight loss over the past 2 months. She was scheduled to see neurology as OP in May. Head CT detailed below. She cannot have MRI as he has spinal stimulator. The hospitalist have been asked to admit. Results summary of Diagnostic Studies/Procedures copied from within [facility] EMR:   Head CT  IMPRESSION: Normal unenhanced CT of the brain for age. No acute intracranial  abnormality. Complete ROS done and is as stated in HPI or otherwise negative  Past Medical History:   Diagnosis Date    Blood loss anemia     after surgery    CAD (coronary artery disease) 2014    angioplasty-/Followed by [address] Cardiology    Cervical myelopathy     Cervical spinal stenosis     Chronic lower back pain     GERD (gastroesophageal reflux disease)     managed with medication     H/O methicillin resistant Staphylococcus aureus infection     History of staph infection     from dog bite to LLE/ treated     Insomnia     Lumbar radiculopathy, acute     Lumbar stenosis without neurogenic claudication     Mixed hyperlipidemia     managed with medication     Nausea & vomiting     post-op nausea, pt states she does well with antiemetic    Other intervertebral disc degeneration, lumbar region     Primary insomnia     PUD (peptic ulcer disease)     no recent symptoms    Scoliosis, idiopathic, infantile     Spinal stenosis, cervical region       Past Surgical History:   Procedure Laterality Date   Carrie Cobb at [place].     HX APPENDECTOMY      along with lap chol    HX BLADDER REPAIR  2010    HX CATARACT REMOVAL Bilateral     HX CERVICAL FUSION  07/2016    plates    HX COLONOSCOPY  2014    HX ENDOSCOPY  10/2015    HX HYSTERECTOMY  1972    HX LAP CHOLECYSTECTOMY      along with appendectomy    HX OOPHORECTOMY  1999    ovarian cyst and ovary removed    HX ORTHOPAEDIC  01/2015    Dr Amezquita Rater injections in lower back    HX TUMOR REMOVAL  at age of 40    uterine    VASCULAR SURGERY PROCEDURE UNLIST        Prior to Admission Medications   Prescriptions Last Dose Informant Patient Reported? Taking? LOTEMAX 0.5 % ophthalmic suspension   Yes No   Sig: Administer 1 Drop to both eyes two (2) times a day. aspirin 81 mg chewable tablet   Yes No   Sig: Take 81 mg by mouth nightly. biotin 10,000 mcg cap   Yes No   Sig: Take  by mouth daily (after lunch). cholecalciferol (VITAMIN D3) 1,000 unit tablet   Yes No   Sig: Take 1,000 Units by mouth daily (after lunch).   magnesium oxide (MAG-OX) 400 mg tablet   Yes No   Sig: Take 400 mg by mouth daily (after lunch). mirtazapine (REMERON) 15 mg tablet   No No   Sig: Take 1 Tab by mouth nightly. Patient needs an Office visit for further refill   multivitamin (ONE A DAY) tablet   Yes No   Sig: Take 1 Tab by mouth daily (after lunch). omeprazole (PRILOSEC) 20 mg capsule   No No   Sig: Take 1 Cap by mouth every morning. Indications: GASTROESOPHAGEAL REFLUX   oxyCODONE-acetaminophen (PERCOCET 7.5) 7.5-325 mg per tablet   No No   Sig: Take 1 Tab by mouth every eight (8) hours as needed for Pain. Max Daily Amount: 3 Tabs. potassium 99 mg tablet   Yes No   Sig: Take 99 mg by mouth daily (after lunch). simvastatin (ZOCOR) 40 mg tablet   No No   Sig: Take 1 Tab by mouth nightly. Indications: hypercholesterolemia   traMADol (ULTRAM) 50 mg tablet   No No   Sig: Take 1 Tab by mouth every eight (8) hours as needed for Pain. Max Daily Amount: 150 mg.       Facility-Administered Medications: None     Allergies   Allergen Reactions    Lortab [Hydrocodone-Acetaminophen] Unknown (comments)    Lunesta [Eszopiclone] Other (comments)     Nightmares        Social History   Substance Use Topics    Smoking status: Never Smoker    Smokeless tobacco: Never Used    Alcohol use No      Family History   Problem Relation Age of Onset    Hypertension Mother     Stroke Mother     Osteoporosis Mother     Heart Disease Father     Osteoporosis Sister     Parkinson's Disease Sister     Cancer Brother     No Known Problems Sister     No Known Problems Sister     No Known Problems Sister     No Known Problems Sister     No Known Problems Brother     Arthritis-osteo Other           Objective:     Visit Vitals    /72 (BP 1 Location: Left arm)    Pulse 74    Temp 98 °F (36.7 °C)    Resp 18    Ht 5' 2\" (1.575 m)    Wt 47.2 kg (104 lb)    SpO2 98%    BMI 19.02 kg/m2      Temp (24hrs), Av °F (36.7 °C), Min:98 °F (36.7 °C), Max:98 °F (36.7 °C)    Oxygen Therapy  O2 Sat (%): 98 % (17)  O2 Device: Room air (17)  Physical Exam:  General:    Alert, cooperative, no distress, appears stated age. Head:   Normocephalic, without obvious abnormality, atraumatic. Nose:  Nares normal. No drainage or sinus tenderness. Lungs:   CTA  Heart:  RRR  Abdomen:   Soft, non-tender. Not distended. Bowel sounds normal. No masses  Extremities: No cyanosis. No edema. No clubbing  Skin:     Texture, turgor normal. No rashes or lesions.   Not Jaundiced  Neurologic: Alert and oriented times 4, no dysarthria, no facial droop, right sided weakness   Data Review:   Recent Results (from the past 24 hour(s))   CBC WITH AUTOMATED DIFF    Collection Time: 17 11:22 AM   Result Value Ref Range    WBC 5.9 4.3 - 11.1 K/uL    RBC 4.20 4.05 - 5.25 M/uL    HGB 12.8 11.7 - 15.4 g/dL    HCT 38.9 35.8 - 46.3 %    MCV 92.6 79.6 - 97.8 FL    MCH 30.5 26.1 - 32.9 PG    MCHC 32.9 31.4 - 35.0 g/dL    RDW 12.6 11.9 - 14.6 %    PLATELET 812 396 - 685 K/uL    MPV 11.9 10.8 - 14.1 FL    DF AUTOMATED      NEUTROPHILS 59 43 - 78 %    LYMPHOCYTES 36 13 - 44 %    MONOCYTES 3 (L) 4.0 - 12.0 %    EOSINOPHILS 2 0.5 - 7.8 %    BASOPHILS 0 0.0 - 2.0 %    IMMATURE GRANULOCYTES 0.2 0.0 - 5.0 %    ABS. NEUTROPHILS 3.5 1.7 - 8.2 K/UL    ABS. LYMPHOCYTES 2.1 0.5 - 4.6 K/UL    ABS. MONOCYTES 0.2 0.1 - 1.3 K/UL    ABS. EOSINOPHILS 0.1 0.0 - 0.8 K/UL    ABS. BASOPHILS 0.0 0.0 - 0.2 K/UL    ABS. IMM. GRANS. 0.0 0.0 - 0.5 K/UL   METABOLIC PANEL, COMPREHENSIVE    Collection Time: 03/23/17 11:22 AM   Result Value Ref Range    Sodium 139 136 - 145 mmol/L    Potassium 4.3 3.5 - 5.1 mmol/L    Chloride 104 98 - 107 mmol/L    CO2 29 21 - 32 mmol/L    Anion gap 6 (L) 7 - 16 mmol/L    Glucose 98 65 - 100 mg/dL    BUN 21 8 - 23 MG/DL    Creatinine 0.67 0.6 - 1.0 MG/DL    GFR est AA >60 >60 ml/min/1.73m2    GFR est non-AA >60 >60 ml/min/1.73m2    Calcium 9.2 8.3 - 10.4 MG/DL    Bilirubin, total 0.3 0.2 - 1.1 MG/DL    ALT (SGPT) 23 12 - 65 U/L    AST (SGOT) 24 15 - 37 U/L    Alk. phosphatase 76 50 - 136 U/L    Protein, total 7.3 6.3 - 8.2 g/dL    Albumin 3.8 3.2 - 4.6 g/dL    Globulin 3.5 2.3 - 3.5 g/dL    A-G Ratio 1.1 (L) 1.2 - 3.5         Assessment and Plan:      Active Hospital Problems    Diagnosis Date Noted    Right sided weakness 03/23/2017    Dysphagia 03/23/2017    Mixed hyperlipidemia     GERD (gastroesophageal reflux disease)      controlled with med     Admit to medical bed for observation   CT Cervical and Lumbar spine   Consult Neurology   Consult GI  PT/OT/ST  PPD  Surrogate decision maker: spouse, Particia Copa   Estimated length of stay:1-2 days   Tony Daily., PA

## 2017-03-23 NOTE — PROGRESS NOTES
TRANSFER - IN REPORT:    Verbal report received from Matthew RN(name) on She Patel  being received from ED(unit) for routine progression of care      Report consisted of patients Situation, Background, Assessment and   Recommendations(SBAR). Information from the following report(s) SBAR, Intake/Output and MAR was reviewed with the receiving nurse. Opportunity for questions and clarification was provided. Assessment to be completed upon patients arrival to unit and care assumed.

## 2017-03-23 NOTE — ED NOTES
TRANSFER - OUT REPORT:    Verbal report given to Maryan DAI (name) on Neto Bryant  being transferred to [facility] (unit) for routine progression of care       Report consisted of patients Situation, Background, Assessment and   Recommendations(SBAR). Information from the following report(s) SBAR was reviewed with the receiving nurse. Lines:   Peripheral IV 03/23/17 Left Antecubital (Active)   Site Assessment Clean, dry, & intact 3/23/2017  2:41 PM   Phlebitis Assessment 0 3/23/2017  2:41 PM   Infiltration Assessment 0 3/23/2017  2:41 PM   Dressing Status Clean, dry, & intact 3/23/2017  2:41 PM        Opportunity for questions and clarification was provided.       Patient transported with:   [employer]
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
## 2017-03-23 NOTE — ED PROVIDER NOTES
Patient is a 68 y.o. female presenting with weakness. The history is provided by the patient and the spouse. Extremity Weakness    This is a new problem. Episode onset: 2 weeks. The problem occurs constantly. The problem has been gradually worsening. The pain is present in the right upper leg, right lower leg and right arm. The patient is experiencing no pain. Pertinent negatives include no numbness, full range of motion, no tingling, no back pain and no neck pain. She has tried nothing for the symptoms. There has been no history of extremity trauma. Past Medical History:   Diagnosis Date    Blood loss anemia     after surgery    CAD (coronary artery disease) 01/2014    angioplasty-2014/[facility] by [facility]    Cervical myelopathy     Cervical spinal stenosis     Chronic lower back pain     GERD (gastroesophageal reflux disease)     managed with medication     H/O methicillin resistant Staphylococcus aureus infection     History of staph infection 2012    from dog bite to LLE/ treated     Insomnia     Lumbar radiculopathy, acute     Lumbar stenosis without neurogenic claudication     Mixed hyperlipidemia     managed with medication     Nausea & vomiting     post-op nausea, pt states she does well with antiemetic    Other intervertebral disc degeneration, lumbar region     Primary insomnia     PUD (peptic ulcer disease)     no recent symptoms    Scoliosis, idiopathic, infantile     Spinal stenosis, cervical region        Past Surgical History:   Procedure Laterality Date   [facility]      [facility] Sapphire Silverman [facility].     HX APPENDECTOMY      along with lap chol    HX BLADDER REPAIR  2010    HX CATARACT REMOVAL Bilateral     HX CERVICAL FUSION  07/2016    plates    HX COLONOSCOPY  2014    HX ENDOSCOPY  10/2015    HX HYSTERECTOMY  1972    HX LAP CHOLECYSTECTOMY      along with appendectomy    HX OOPHORECTOMY  1999    ovarian cyst and ovary removed    HX ORTHOPAEDIC  01/2015    Dr Blondie Koyanagi injections in lower back    HX TUMOR REMOVAL  at age of 40    uterine   [de-identified] VASCULAR SURGERY PROCEDURE UNLIST           Family History:   Problem Relation Age of Onset    Hypertension Mother     Stroke Mother     Osteoporosis Mother     Heart Disease Father     Osteoporosis Sister     Parkinson's Disease Sister     Cancer Brother     No Known Problems Sister     No Known Problems Sister     No Known Problems Sister     No Known Problems Sister     No Known Problems Brother     Arthritis-osteo Other        Social History     Social History    Marital status:      Spouse name: N/A    Number of children: N/A    Years of education: N/A     Occupational History    Not on file. Social History Main Topics    Smoking status: Never Smoker    Smokeless tobacco: Never Used    Alcohol use No    Drug use: No    Sexual activity: No     Other Topics Concern    Not on file     Social History Narrative         ALLERGIES: Lortab [hydrocodone-acetaminophen] and Lunesta [eszopiclone]    Review of Systems   Constitutional: Negative for chills and fever. HENT: Negative for rhinorrhea and sore throat. Eyes: Negative for discharge and redness. Respiratory: Negative for cough and shortness of breath. Cardiovascular: Negative for chest pain and palpitations. Gastrointestinal: Negative for abdominal pain, diarrhea, nausea and vomiting. Musculoskeletal: Positive for gait problem. Negative for arthralgias, back pain and neck pain. Skin: Negative for rash. Neurological: Positive for speech difficulty and weakness. Negative for dizziness, tingling, numbness and headaches. All other systems reviewed and are negative.       Vitals:    03/23/17 1120 03/23/17 1438   BP: 185/61 133/79   Pulse: (!) 110 78   Resp: 16 16   Temp: 98 °F (36.7 °C)    SpO2: 98% 98%   Weight: 47.2 kg (104 lb)    Height: 5' 2\" (1.575 m)             Physical Exam Constitutional: She is oriented to person, place, and time. She appears well-developed and well-nourished. No distress. HENT:   Head: Normocephalic and atraumatic. Eyes: Conjunctivae are normal. Pupils are equal, round, and reactive to light. Right eye exhibits no discharge. Left eye exhibits no discharge. No scleral icterus. Neck: Normal range of motion. Neck supple. Cardiovascular: Normal rate, regular rhythm and normal heart sounds. Exam reveals no gallop. No murmur heard. Pulmonary/Chest: Effort normal and breath sounds normal. No respiratory distress. She has no wheezes. She has no rales. Abdominal: Soft. Bowel sounds are normal. There is no tenderness. There is no guarding. Musculoskeletal: Normal range of motion. She exhibits no edema or tenderness. Neurological: She is alert and oriented to person, place, and time. She exhibits normal muscle tone. cni 2-12 , fascies symmetric  Hint of a right pronator drift. Right quad/psoas, bicep and triceps are all 4/5, compared to 5/5 on the left. Nl sensation x5    Finger to nose accurate,  Speech a little slowed, but not obviously slurred,   however doesn't sound normal/right according to    Skin: Skin is warm and dry. She is not diaphoretic. Psychiatric: She has a normal mood and affect. Her behavior is normal.   Nursing note and vitals reviewed. MDM  Number of Diagnoses or Management Options  Diagnosis management comments: Medical decision making note:  New onset hemiplegia about two weeks ago, no prior cva/tia  admit  This concludes the \"medical decision making note\" part of this emergency department visit note.       ED Course       Procedures

## 2017-03-23 NOTE — CONSULTS
Subjective:      Referring Physician : gurinder    Chief Complaint : dysphagia    History of Present Illness : progressive after cervical neck surgery- had relief in 2015 with empiric 47 fr kemal but that  Was before surgery; currently having neurologic issues    Pain- no  Bleeding- no  Bowel movements- yes  Nausea- no  Vomiting- no  ASA/ NSAIDS- 81 mg  Anticoagulants- no  Wt loss- 15 lbs  Past Medical History:   Diagnosis Date    Blood loss anemia     after surgery    CAD (coronary artery disease) 01/2014    angioplasty-2014/[facility] by [facility]    Cervical myelopathy     Cervical spinal stenosis     Chronic lower back pain     GERD (gastroesophageal reflux disease)     managed with medication     H/O methicillin resistant Staphylococcus aureus infection     History of staph infection 2012    from dog bite to LLE/ treated     Insomnia     Lumbar radiculopathy, acute     Lumbar stenosis without neurogenic claudication     Mixed hyperlipidemia     managed with medication     Nausea & vomiting     post-op nausea, pt states she does well with antiemetic    Other intervertebral disc degeneration, lumbar region     Primary insomnia     PUD (peptic ulcer disease)     no recent symptoms    Scoliosis, idiopathic, infantile     Spinal stenosis, cervical region      Past Surgical History:   Procedure Laterality Date   Palomo Lancaster at [facility].     HX APPENDECTOMY      along with lap chol    HX BLADDER REPAIR  2010    HX CATARACT REMOVAL Bilateral     HX CERVICAL FUSION  07/2016    plates    HX COLONOSCOPY  01/12/2016    diverticulosis    HX ENDOSCOPY  10/2015    HX ENDOSCOPY  11/02/2015    54 [address]    HX HYSTERECTOMY  1972    HX LAP CHOLECYSTECTOMY      along with appendectomy    HX OOPHORECTOMY  1999    ovarian cyst and ovary removed    HX ORTHOPAEDIC  01/2015    Dr Birder Cushing injections in lower back    HX TUMOR REMOVAL  at age of 40    uterine    VASCULAR SURGERY PROCEDURE UNLIST       Family History   Problem Relation Age of Onset   Donte Moreno Hypertension Mother     Stroke Mother     Osteoporosis Mother     Heart Disease Father     Osteoporosis Sister     Parkinson's Disease Sister     Cancer Brother     No Known Problems Sister     No Known Problems Sister     No Known Problems Sister     No Known Problems Sister     No Known Problems Brother     Arthritis-osteo Other        Prior to Admission medications    Medication Sig Start Date End Date Taking? Authorizing Provider   polyethylene glycol (MIRALAX) 17 gram packet Take 17 g by mouth every other day. Yes Historical Provider   simvastatin (ZOCOR) 40 mg tablet Take 1 Tab by mouth nightly. Indications: hypercholesterolemia 3/13/17   Bon Crowell MD   oxyCODONE-acetaminophen (PERCOCET 7.5) 7.5-325 mg per tablet Take 1 Tab by mouth every eight (8) hours as needed for Pain. Max Daily Amount: 3 Tabs. 2/10/17   Leti Blake MD   cholecalciferol (VITAMIN D3) 1,000 unit tablet Take 1,000 Units by mouth daily (after lunch). Historical Provider   mirtazapine (REMERON) 15 mg tablet Take 1 Tab by mouth nightly. Patient needs an Office visit for further refill 1/19/17   [facility] T Brothers, DO   omeprazole (PRILOSEC) 20 mg capsule Take 1 Cap by mouth every morning. Indications: GASTROESOPHAGEAL REFLUX 9/6/16   Makenzie Love MD   traMADol Sirena Barriga) 50 mg tablet Take 1 Tab by mouth every eight (8) hours as needed for Pain. Max Daily Amount: 150 mg. 3/21/16   Makenzie Love MD   biotin 10,000 mcg cap Take  by mouth daily (after lunch). Historical Provider   LOTEMAX 0.5 % ophthalmic suspension Administer 1 Drop to both eyes two (2) times a day. 12/10/14   Historical Provider   aspirin 81 mg chewable tablet Take 81 mg by mouth nightly. Historical Provider   multivitamin (ONE A DAY) tablet Take 1 Tab by mouth daily (after lunch).     Historical Provider     Allergies Allergen Reactions    Lortab [Hydrocodone-Acetaminophen] Unknown (comments)    Lunesta [Eszopiclone] Other (comments)     Nightmares       Social History     Occupational History    Not on file. Social History Main Topics    Smoking status: Never Smoker    Smokeless tobacco: Never Used    Alcohol use No    Drug use: No    Sexual activity: No       Review of Systems: The rest of 10 organ review of systems is negative or as per HPI and PMH. Objective:     Physical Exam:     Patient Vitals for the past 8 hrs:   BP Temp Pulse Resp SpO2   03/23/17 1929 131/63 97.3 °F (36.3 °C) 81 18 97 %   03/23/17 1707 130/52 97.5 °F (36.4 °C) 69 18 98 %   03/23/17 1629 138/72 - 74 18 98 %   03/23/17 1438 133/79 - 78 16 98 %     General:  Skin:  Extremities and face reveal no rashes. No chau erythema. No telangiectasias on the chest wall. HEENT: Sclerae anicteric. No oral ulcers. No abnormal pigmentation of the lips. Cardiovascular: Regular rate and rhythm. No murmurs, gallops, or rubs. PMI nondisplaced. Carotids without bruits. Respiratory:  Comfortable breathing  With no accessory muscle use. Clear breath sounds with no rales bruits. GI:  Abdomen nondistended, soft, and nontender. Normal active bowel sounds. No enlargement of the liver or spleen. No masses palpable. Rectal:  Deferred  Musculoskeletal:  No pitting edema of the lower legs. The neck is supple and extremities have good range of motion. No costovertebral tenderness. Neurological:  Gross memory appears intact. Patient is alert and oriented. Psychiatric:  Mood appears appropriate with judgement intact. Lymphatic:  No cervical or supraclavicular adenopathy.     Laboratory:    Lab Results   Component Value Date/Time    WBC 5.9 03/23/2017 11:22 AM    RBC 4.20 03/23/2017 11:22 AM    HGB 12.8 03/23/2017 11:22 AM    HCT 38.9 03/23/2017 11:22 AM    PLATELET 762 85/91/5797 11:22 AM     Lab Results   Component Value Date/Time    Sodium 139 03/23/2017 11:22 AM    Potassium 4.3 03/23/2017 11:22 AM    Chloride 104 03/23/2017 11:22 AM    CO2 29 03/23/2017 11:22 AM    Anion gap 6 03/23/2017 11:22 AM    Glucose 98 03/23/2017 11:22 AM    BUN 21 03/23/2017 11:22 AM    Creatinine 0.67 03/23/2017 11:22 AM    GFR est AA >60 03/23/2017 11:22 AM    GFR est non-AA >60 03/23/2017 11:22 AM    Calcium 9.2 03/23/2017 11:22 AM    Magnesium 2.3 03/13/2017 02:45 PM    Albumin 3.8 03/23/2017 11:22 AM    Bilirubin, total 0.3 03/23/2017 11:22 AM    Protein, total 7.3 03/23/2017 11:22 AM    Globulin 3.5 03/23/2017 11:22 AM    A-G Ratio 1.1 03/23/2017 11:22 AM    AST (SGOT) 24 03/23/2017 11:22 AM    ALT (SGPT) 23 03/23/2017 11:22 AM          Assessment/Plan:       Hospital Problems  Date Reviewed: 1/19/2017          Codes Class Noted POA    * (Principal)Right sided weakness ICD-10-CM: M62.81  ICD-9-CM: 728.87  3/23/2017 Yes        Dysphagia ICD-10-CM: R13.10  ICD-9-CM: 787.20  3/23/2017 Yes        GERD (gastroesophageal reflux disease) ICD-10-CM: K21.9  ICD-9-CM: 530.81  Unknown Yes    Overview Signed 1/29/2015 11:24 AM by Tiffanie Frazier     controlled with med             Mixed hyperlipidemia ICD-10-CM: E78.2  ICD-9-CM: 272.2  Unknown Yes          relief in the past but may be a different issue now- post surgical or neurological ?  May be oropharyngeal and has problems with even small pills    Plan- will start with EGD and dilation but may require speech path eval or manometry   Currently no problem with aspiration but exacerbated by fear    PT SEEN AND EXAMINED  [address]   Cristi Harper MD

## 2017-03-23 NOTE — IP AVS SNAPSHOT
[address] 
[phone] Patient: Xu Moncada MRN: [id number] You are allergic to the following Allergen Reactions Lortab (Hydrocodone-Acetaminophen) Unknown (comments) Lunesta (Eszopiclone) Other (comments) Nightmares Immunizations Administered for This Admission Name Date  
 TB Skin Test (PPD) Intradermal 3/23/2017 Recent Documentation Height Weight BMI OB Status Smoking Status 1.575 m 47.2 kg 19.02 kg/m2 Hysterectomy Never Smoker Unresulted Labs Order Current Status ALEKSANDAR QL, W/REFLEX CASCADE In process ETHANOL, BLOOD In process H PYLORI AB, IGG, QT In process PROTEIN ELEC WITH PAUL, SERUM In process RHEUMATOID FACTOR, QL In process PLEASE READ & DOCUMENT PPD TEST IN 24 HRS Preliminary result Emergency Contacts Name Discharge Info Relation Home Work Mobile Cheng Martinez DISCHARGE CAREGIVER [3] Spouse [3] [phone] About your hospitalization You were admitted on:  March 23, 2017 You last received care in the:  [facility] 7 MED SURG You were discharged on:  March 26, 2017 Unit phone number:  [phone] Why you were hospitalized Your primary diagnosis was:  Als (Amyotrophic Lateral Sclerosis) (Hcc) Your diagnoses also included:  Right Sided Weakness, Mixed Hyperlipidemia, Gerd (Gastroesophageal Reflux Disease), Dysphagia Providers Seen During Your Hospitalizations Provider Role Specialty Primary office phone Juliet Campuzano MD Attending Provider Emergency Medicine [phone] Leighann Gonzalez MD Attending Provider [facility] [phone] Kelley Uriarte MD Attending Provider Internal Medicine [phone] Your Primary Care Physician (PCP) Primary Care Physician Office Phone Office Fax David Gallegos [phone] Follow-up Information Follow up With Details Comments Contact Info [address]  They will call you to schedule your first home visit. [facility] [address] [facility] [address] 
[phone] Cristal Butt MD   [facility] Internal Medicine [address] [place] [address] 
[phone] Jennifer Garrett MD  Call Office on Monday for follow up Neuro appointment  Heena Ernst [address] [facility] [address] 
[phone] Your Appointments Friday April 14, 2017  2:15 PM EDT Office Visit with Cristal Butt MD  
[facility] ([facility]) [address]   
[phone] Current Discharge Medication List  
  
START taking these medications Dose & Instructions Dispensing Information Comments Morning Noon Evening Bedtime  
 riluzole tablet Commonly known as:  Magui [place] Your last dose was: Your next dose is:    
   
   
 Dose:  50 mg Take 1 Tab by mouth every twelve (12) hours. Quantity:  60 Tab Refills:  1 CONTINUE these medications which have NOT CHANGED Dose & Instructions Dispensing Information Comments Morning Noon Evening Bedtime  
 aspirin 81 mg chewable tablet Your last dose was: Your next dose is:    
   
   
 Dose:  81 mg Take 81 mg by mouth nightly. Refills:  0  
     
   
   
   
  
 biotin 10,000 mcg Cap Your last dose was: Your next dose is: Take  by mouth daily (after lunch). Refills:  0 LOTEMAX 0.5 % ophthalmic suspension Generic drug:  loteprednol etabonate Your last dose was: Your next dose is:    
   
   
 Dose:  1 Drop Administer 1 Drop to both eyes two (2) times a day. Refills:  0 MIRALAX 17 gram packet Generic drug:  polyethylene glycol Your last dose was: Your next dose is:    
   
   
 Dose:  17 g Take 17 g by mouth every other day. Refills:  0  
     
   
   
   
  
 mirtazapine 15 mg tablet Commonly known as:  Rhodia Lade Your last dose was: Your next dose is:    
   
   
 Dose:  15 mg Take 1 Tab by mouth nightly. Patient needs an Office visit for further refill Quantity:  90 Tab Refills:  3  
     
   
   
   
  
 multivitamin tablet Commonly known as:  ONE A DAY Your last dose was: Your next dose is:    
   
   
 Dose:  1 Tab Take 1 Tab by mouth daily (after lunch). Refills:  0  
     
   
   
   
  
 omeprazole 20 mg capsule Commonly known as:  PRILOSEC Your last dose was: Your next dose is:    
   
   
 Dose:  20 mg Take 1 Cap by mouth every morning. Indications: GASTROESOPHAGEAL REFLUX Quantity:  90 Cap Refills:  1  
     
   
   
   
  
 oxyCODONE-acetaminophen 7.5-325 mg per tablet Commonly known as:  PERCOCET 7.5 Your last dose was: Your next dose is:    
   
   
 Dose:  1 Tab Take 1 Tab by mouth every eight (8) hours as needed for Pain. Max Daily Amount: 3 Tabs. Quantity:  30 Tab Refills:  0  
     
   
   
   
  
 simvastatin 40 mg tablet Commonly known as:  ZOCOR Your last dose was: Your next dose is:    
   
   
 Dose:  40 mg Take 1 Tab by mouth nightly. Indications: hypercholesterolemia Quantity:  90 Tab Refills:  3  
     
   
   
   
  
 traMADol 50 mg tablet Commonly known as:  ULTRAM  
   
Your last dose was: Your next dose is:    
   
   
 Dose:  50 mg Take 1 Tab by mouth every eight (8) hours as needed for Pain. Max Daily Amount: 150 mg.  
 Quantity:  90 Tab Refills:  3 VITAMIN D3 1,000 unit tablet Generic drug:  cholecalciferol Your last dose was: Your next dose is:    
   
   
 Dose:  1000 Units Take 1,000 Units by mouth daily (after lunch). Refills:  0 Where to Get Your Medications Information on where to get these meds will be given to you by the nurse or doctor. ! Ask your nurse or doctor about these medications  
  riluzole tablet Discharge Instructions None Discharge Orders None ACO Transitions of Care Introducing [employer] [address] Hilda Anguiano offers a voluntary care coordination program to provide high quality service and care to [facility] fee-for-service beneficiaries. Chirag Larsen was designed to help you enhance your health and well-being through the following services: ? Transitions of Care  support for individuals who are transitioning from one care setting to another (example: Hospital to home). ? Chronic and Complex Care Coordination  support for individuals and caregivers of those with serious or chronic illnesses or with more than one chronic (ongoing) condition and those who take a number of different medications. If you meet specific medical criteria, a [facility] [address] may call you directly to coordinate your care with your primary care physician and your other care providers. For questions about the [facility] programs, please, contact your physicians office. For general questions or additional information about Accountable Care Organizations: 
Please visit www.medicare.gov/acos. html or call 1-800-MEDICARE (8-931.870.5812) TTY users should call 4-264.377.8310. [employer] Announcement We are excited to announce that we are making your provider's discharge notes available to you in [employer]. You will see these notes when they are completed and signed by the physician that discharged you from your recent hospital stay.   If you have any questions or concerns about any information you see in [employer], please call the Health Information Department where you were seen or reach out to your Primary Care Provider for more information about your plan of care. Introducing [facility]! Steve Manning introduces [employer] patient portal. Now you can access parts of your medical record, email your doctor's office, and request medication refills online. 1. In your internet browser, go to [email or web address] 2. Click on the First Time User? Click Here link in the Sign In box. You will see the New Member Sign Up page. 3. Enter your [employer] Access Code exactly as it appears below. You will not need to use this code after youve completed the sign-up process. If you do not sign up before the expiration date, you must request a new code. · [employer] Access Code: [email or web address] Expires: 4/19/2017  4:25 PM 
 
4. Enter the last four digits of your Social Security Number (xxxx) and Date of Birth (mm/dd/yyyy) as indicated and click Submit. You will be taken to the next sign-up page. 5. Create a [employer] ID. This will be your [employer] login ID and cannot be changed, so think of one that is secure and easy to remember. 6. Create a [employer] password. You can change your password at any time. 7. Enter your Password Reset Question and Answer. This can be used at a later time if you forget your password. 8. Enter your e-mail address. You will receive e-mail notification when new information is available in [address] 9. Click Sign Up. You can now view and download portions of your medical record. 10. Click the Download Summary menu link to download a portable copy of your medical information. If you have questions, please visit the Frequently Asked Questions section of the [employer] website. Remember, [employer] is NOT to be used for urgent needs. For medical emergencies, dial 911. Now available from your iPhone and Android! General Information Please provide this summary of care documentation to your next provider. Patient Signature:  ____________________________________________________________ Date:  ____________________________________________________________  
  
[employer] Provider Signature:  ____________________________________________________________ Date:  ____________________________________________________________

## 2017-03-23 NOTE — IP AVS SNAPSHOT
Current Discharge Medication List  
  
START taking these medications Dose & Instructions Dispensing Information Comments Morning Noon Evening Bedtime  
 riluzole tablet Commonly known as:  Shaista Salima Your last dose was: Your next dose is:    
   
   
 Dose:  50 mg Take 1 Tab by mouth every twelve (12) hours. Quantity:  60 Tab Refills:  1 CONTINUE these medications which have NOT CHANGED Dose & Instructions Dispensing Information Comments Morning Noon Evening Bedtime  
 aspirin 81 mg chewable tablet Your last dose was: Your next dose is:    
   
   
 Dose:  81 mg Take 81 mg by mouth nightly. Refills:  0  
     
   
   
   
  
 biotin 10,000 mcg Cap Your last dose was: Your next dose is: Take  by mouth daily (after lunch). Refills:  0 LOTEMAX 0.5 % ophthalmic suspension Generic drug:  loteprednol etabonate Your last dose was: Your next dose is:    
   
   
 Dose:  1 Drop Administer 1 Drop to both eyes two (2) times a day. Refills:  0 MIRALAX 17 gram packet Generic drug:  polyethylene glycol Your last dose was: Your next dose is:    
   
   
 Dose:  17 g Take 17 g by mouth every other day. Refills:  0  
     
   
   
   
  
 mirtazapine 15 mg tablet Commonly known as:  Elfreda Ulysses Your last dose was: Your next dose is:    
   
   
 Dose:  15 mg Take 1 Tab by mouth nightly. Patient needs an Office visit for further refill Quantity:  90 Tab Refills:  3  
     
   
   
   
  
 multivitamin tablet Commonly known as:  ONE A DAY Your last dose was: Your next dose is:    
   
   
 Dose:  1 Tab Take 1 Tab by mouth daily (after lunch). Refills:  0  
     
   
   
   
  
 omeprazole 20 mg capsule Commonly known as:  PRILOSEC Your last dose was: Your next dose is:    
   
   
 Dose:  20 mg Take 1 Cap by mouth every morning. Indications: GASTROESOPHAGEAL REFLUX Quantity:  90 Cap Refills:  1  
     
   
   
   
  
 oxyCODONE-acetaminophen 7.5-325 mg per tablet Commonly known as:  PERCOCET 7.5 Your last dose was: Your next dose is:    
   
   
 Dose:  1 Tab Take 1 Tab by mouth every eight (8) hours as needed for Pain. Max Daily Amount: 3 Tabs. Quantity:  30 Tab Refills:  0  
     
   
   
   
  
 simvastatin 40 mg tablet Commonly known as:  ZOCOR Your last dose was: Your next dose is:    
   
   
 Dose:  40 mg Take 1 Tab by mouth nightly. Indications: hypercholesterolemia Quantity:  90 Tab Refills:  3  
     
   
   
   
  
 traMADol 50 mg tablet Commonly known as:  ULTRAM  
   
Your last dose was: Your next dose is:    
   
   
 Dose:  50 mg Take 1 Tab by mouth every eight (8) hours as needed for Pain. Max Daily Amount: 150 mg.  
 Quantity:  90 Tab Refills:  3 VITAMIN D3 1,000 unit tablet Generic drug:  cholecalciferol Your last dose was: Your next dose is:    
   
   
 Dose:  1000 Units Take 1,000 Units by mouth daily (after lunch). Refills:  0 Where to Get Your Medications Information on where to get these meds will be given to you by the nurse or doctor. ! Ask your nurse or doctor about these medications  
  riluzole tablet

## 2017-03-24 ENCOUNTER — APPOINTMENT (OUTPATIENT)
Dept: INTERVENTIONAL RADIOLOGY/VASCULAR | Age: 77
DRG: 057 | End: 2017-03-24
Attending: HOSPITALIST
Payer: MEDICARE

## 2017-03-24 ENCOUNTER — APPOINTMENT (OUTPATIENT)
Dept: CT IMAGING | Age: 77
DRG: 057 | End: 2017-03-24
Attending: HOSPITALIST
Payer: MEDICARE

## 2017-03-24 ENCOUNTER — ANESTHESIA EVENT (OUTPATIENT)
Dept: ENDOSCOPY | Age: 77
DRG: 057 | End: 2017-03-24
Payer: MEDICARE

## 2017-03-24 ENCOUNTER — ANESTHESIA (OUTPATIENT)
Dept: ENDOSCOPY | Age: 77
DRG: 057 | End: 2017-03-24
Payer: MEDICARE

## 2017-03-24 LAB
ANION GAP BLD CALC-SCNC: 7 MMOL/L (ref 7–16)
BUN SERPL-MCNC: 15 MG/DL (ref 8–23)
CALCIUM SERPL-MCNC: 8.9 MG/DL (ref 8.3–10.4)
CHLORIDE SERPL-SCNC: 107 MMOL/L (ref 98–107)
CO2 SERPL-SCNC: 29 MMOL/L (ref 21–32)
CREAT SERPL-MCNC: 0.68 MG/DL (ref 0.6–1)
ERYTHROCYTE [DISTWIDTH] IN BLOOD BY AUTOMATED COUNT: 12.7 % (ref 11.9–14.6)
GLUCOSE SERPL-MCNC: 77 MG/DL (ref 65–100)
HCT VFR BLD AUTO: 35.9 % (ref 35.8–46.3)
HGB BLD-MCNC: 11.7 G/DL (ref 11.7–15.4)
MCH RBC QN AUTO: 30.2 PG (ref 26.1–32.9)
MCHC RBC AUTO-ENTMCNC: 32.6 G/DL (ref 31.4–35)
MCV RBC AUTO: 92.5 FL (ref 79.6–97.8)
MM INDURATION POC: 0 MM (ref 0–5)
PLATELET # BLD AUTO: 189 K/UL (ref 150–450)
PMV BLD AUTO: 12.1 FL (ref 10.8–14.1)
POTASSIUM SERPL-SCNC: 3.9 MMOL/L (ref 3.5–5.1)
PPD POC: NORMAL NEGATIVE
RBC # BLD AUTO: 3.88 M/UL (ref 4.05–5.25)
SODIUM SERPL-SCNC: 143 MMOL/L (ref 136–145)
WBC # BLD AUTO: 5.2 K/UL (ref 4.3–11.1)

## 2017-03-24 PROCEDURE — 62302 MYELOGRAPHY LUMBAR INJECTION: CPT

## 2017-03-24 PROCEDURE — 74011250636 HC RX REV CODE- 250/636: Performed by: ANESTHESIOLOGY

## 2017-03-24 PROCEDURE — 99218 HC RM OBSERVATION: CPT

## 2017-03-24 PROCEDURE — G8978 MOBILITY CURRENT STATUS: HCPCS

## 2017-03-24 PROCEDURE — G8997 SWALLOW GOAL STATUS: HCPCS

## 2017-03-24 PROCEDURE — G8979 MOBILITY GOAL STATUS: HCPCS

## 2017-03-24 PROCEDURE — 36415 COLL VENOUS BLD VENIPUNCTURE: CPT | Performed by: HOSPITALIST

## 2017-03-24 PROCEDURE — 74011250637 HC RX REV CODE- 250/637

## 2017-03-24 PROCEDURE — B01B1ZZ FLUOROSCOPY OF SPINAL CORD USING LOW OSMOLAR CONTRAST: ICD-10-PCS | Performed by: RADIOLOGY

## 2017-03-24 PROCEDURE — 74011000250 HC RX REV CODE- 250

## 2017-03-24 PROCEDURE — G8980 MOBILITY D/C STATUS: HCPCS

## 2017-03-24 PROCEDURE — 76060000031 HC ANESTHESIA FIRST 0.5 HR: Performed by: INTERNAL MEDICINE

## 2017-03-24 PROCEDURE — 74011000250 HC RX REV CODE- 250: Performed by: PHYSICIAN ASSISTANT

## 2017-03-24 PROCEDURE — 77030014143 HC TY PUNC LUMBR BD -A

## 2017-03-24 PROCEDURE — 92610 EVALUATE SWALLOWING FUNCTION: CPT

## 2017-03-24 PROCEDURE — 74011250636 HC RX REV CODE- 250/636: Performed by: HOSPITALIST

## 2017-03-24 PROCEDURE — 74011636320 HC RX REV CODE- 636/320: Performed by: PHYSICIAN ASSISTANT

## 2017-03-24 PROCEDURE — 74011250637 HC RX REV CODE- 250/637: Performed by: HOSPITALIST

## 2017-03-24 PROCEDURE — 0D758ZZ DILATION OF ESOPHAGUS, VIA NATURAL OR ARTIFICIAL OPENING ENDOSCOPIC: ICD-10-PCS | Performed by: INTERNAL MEDICINE

## 2017-03-24 PROCEDURE — G8996 SWALLOW CURRENT STATUS: HCPCS

## 2017-03-24 PROCEDURE — 74011250636 HC RX REV CODE- 250/636

## 2017-03-24 PROCEDURE — 80048 BASIC METABOLIC PNL TOTAL CA: CPT | Performed by: HOSPITALIST

## 2017-03-24 PROCEDURE — 72126 CT NECK SPINE W/DYE: CPT

## 2017-03-24 PROCEDURE — 86677 HELICOBACTER PYLORI ANTIBODY: CPT | Performed by: INTERNAL MEDICINE

## 2017-03-24 PROCEDURE — 85027 COMPLETE CBC AUTOMATED: CPT | Performed by: HOSPITALIST

## 2017-03-24 PROCEDURE — 77030003666 HC NDL SPINAL BD -A

## 2017-03-24 PROCEDURE — 76040000025: Performed by: INTERNAL MEDICINE

## 2017-03-24 PROCEDURE — 97162 PT EVAL MOD COMPLEX 30 MIN: CPT

## 2017-03-24 RX ORDER — ENOXAPARIN SODIUM 100 MG/ML
30 INJECTION SUBCUTANEOUS EVERY 24 HOURS
Status: DISCONTINUED | OUTPATIENT
Start: 2017-03-24 | End: 2017-03-26 | Stop reason: HOSPADM

## 2017-03-24 RX ORDER — ACETAMINOPHEN 325 MG/1
650 TABLET ORAL
Status: DISCONTINUED | OUTPATIENT
Start: 2017-03-24 | End: 2017-03-26 | Stop reason: HOSPADM

## 2017-03-24 RX ORDER — LIDOCAINE HYDROCHLORIDE 20 MG/ML
100-200 INJECTION, SOLUTION INFILTRATION; PERINEURAL ONCE
Status: COMPLETED | OUTPATIENT
Start: 2017-03-24 | End: 2017-03-24

## 2017-03-24 RX ORDER — LIDOCAINE HYDROCHLORIDE 20 MG/ML
INJECTION, SOLUTION EPIDURAL; INFILTRATION; INTRACAUDAL; PERINEURAL AS NEEDED
Status: DISCONTINUED | OUTPATIENT
Start: 2017-03-24 | End: 2017-03-24 | Stop reason: HOSPADM

## 2017-03-24 RX ORDER — SODIUM CHLORIDE, SODIUM LACTATE, POTASSIUM CHLORIDE, CALCIUM CHLORIDE 600; 310; 30; 20 MG/100ML; MG/100ML; MG/100ML; MG/100ML
1000 INJECTION, SOLUTION INTRAVENOUS CONTINUOUS
Status: DISCONTINUED | OUTPATIENT
Start: 2017-03-24 | End: 2017-03-24 | Stop reason: HOSPADM

## 2017-03-24 RX ORDER — PROPOFOL 10 MG/ML
INJECTION, EMULSION INTRAVENOUS AS NEEDED
Status: DISCONTINUED | OUTPATIENT
Start: 2017-03-24 | End: 2017-03-24 | Stop reason: HOSPADM

## 2017-03-24 RX ORDER — PROPOFOL 10 MG/ML
INJECTION, EMULSION INTRAVENOUS
Status: DISCONTINUED | OUTPATIENT
Start: 2017-03-24 | End: 2017-03-24 | Stop reason: HOSPADM

## 2017-03-24 RX ADMIN — IOPAMIDOL 15 ML: 612 INJECTION, SOLUTION INTRATHECAL at 15:41

## 2017-03-24 RX ADMIN — ASPIRIN 81 MG: 81 TABLET, CHEWABLE ORAL at 22:00

## 2017-03-24 RX ADMIN — LIDOCAINE HYDROCHLORIDE 40 MG: 20 INJECTION, SOLUTION EPIDURAL; INFILTRATION; INTRACAUDAL; PERINEURAL at 10:51

## 2017-03-24 RX ADMIN — LIDOCAINE HYDROCHLORIDE 100 MG: 20 INJECTION, SOLUTION INFILTRATION; PERINEURAL at 15:41

## 2017-03-24 RX ADMIN — ACETAMINOPHEN 650 MG: 325 TABLET, FILM COATED ORAL at 18:14

## 2017-03-24 RX ADMIN — SIMVASTATIN 40 MG: 40 TABLET, FILM COATED ORAL at 22:00

## 2017-03-24 RX ADMIN — PROPOFOL 50 MG: 10 INJECTION, EMULSION INTRAVENOUS at 10:52

## 2017-03-24 RX ADMIN — PROPOFOL 120 MCG/KG/MIN: 10 INJECTION, EMULSION INTRAVENOUS at 10:52

## 2017-03-24 RX ADMIN — SODIUM CHLORIDE, SODIUM LACTATE, POTASSIUM CHLORIDE, AND CALCIUM CHLORIDE 1000 ML: 600; 310; 30; 20 INJECTION, SOLUTION INTRAVENOUS at 09:30

## 2017-03-24 RX ADMIN — MIRTAZAPINE 15 MG: 15 TABLET, FILM COATED ORAL at 22:00

## 2017-03-24 RX ADMIN — SODIUM CHLORIDE 75 ML/HR: 900 INJECTION, SOLUTION INTRAVENOUS at 18:15

## 2017-03-24 RX ADMIN — ENOXAPARIN SODIUM 30 MG: 30 INJECTION SUBCUTANEOUS at 22:05

## 2017-03-24 NOTE — PROGRESS NOTES
Hospitalist Progress Note    3/24/2017  Admit Date: 3/23/2017  2:04 PM   NAME: Se Aponte   :  1940   DOS:              17  MRN:  [id number]   Attending: Zoya Batista MD  PCP:  Sivan Crenshaw MD  Treatment Team: Attending Provider: Zoya Batista MD; Consulting Provider: Melva Lenz MD; Consulting Provider: David Weathers MD; Charge Nurse: Vee Gamboa RN; Utilization Review: Ramos Paredes; Care Manager: Davina Winchester NASIM    Full Code     SUBJECTIVE:   As previously documented: \" Ms. Arsenio Little is a pleasant 67 yo F who complains of increased RUE and RLE extremity weakness for months that is getting worse in the last 2 weeks. Using a walker. Complains also of increased dysphagia since her cervical surgery last year. Has extensive PMHX with cervical and lumbar radiculopathy, recent spinal cord stimulator for chronic lumbar pain laced on 2017. Complains of increased weight loss due to lack of appetite and dysphagia. Hx of esophageal dilatation 15 years prior, never follow-up after that. \"       17  Ms. Se Aponte  States that she feels the same . Afebrile. Denies any SOB, CP or abdominal pain. Had EGD today that showed a small duodenal ulcer and had empirical esophageal dilatation. CT spine myelogram today w/o acute pathology. Plan for EMG tomorrow. 10+ ROS reviewed ane negative except for positive in HPI.    Allergies   Allergen Reactions    Lortab [Hydrocodone-Acetaminophen] Unknown (comments)    Lunesta [Eszopiclone] Other (comments)     Nightmares       Current Facility-Administered Medications   Medication Dose Route Frequency Provider Last Rate Last Dose    acetaminophen (TYLENOL) tablet 650 mg  650 mg Oral Q6H PRN Zoya Batista MD   650 mg at 174    aspirin chewable tablet 81 mg  81 mg Oral QHS LAURIE Aguiar   81 mg at 17 2248    mirtazapine (REMERON) tablet 15 mg  15 mg Oral QHS LAURIE Aguiar   15 mg at 17 2248    simvastatin (ZOCOR) tablet 40 mg  40 mg Oral QHS Chari Grave., PA   40 mg at 17 2248    sodium chloride (NS) flush 5-10 mL  5-10 mL IntraVENous Q8H LAURIE Steen   Stopped at 17 1400    sodium chloride (NS) flush 5-10 mL  5-10 mL IntraVENous PRN LAURIE Steen        pantoprazole (PROTONIX) tablet 40 mg  40 mg Oral ACB Chari Goldstein, PA   Stopped at 17 0730    0.9% sodium chloride infusion  75 mL/hr IntraVENous CONTINUOUS Alana Partida MD 75 mL/hr at 17 1815 75 mL/hr at 17 181         PHYSICAL EXAM     Visit Vitals    /80 (BP 1 Location: Right arm, BP Patient Position: At rest)    Pulse 73    Temp 97.4 °F (36.3 °C)    Resp 16    Ht 5' 2\" (1.575 m)    Wt 47.2 kg (104 lb)    SpO2 97%    BMI 19.02 kg/m2      Temp (24hrs), Av.5 °F (36.4 °C), Min:97 °F (36.1 °C), Max:98 °F (36.7 °C)    Oxygen Therapy  O2 Sat (%): 97 % (17)  Pulse via Oximetry: 73 beats per minute (17)  O2 Device: Room air (17)    Intake/Output Summary (Last 24 hours) at 17  Last data filed at 17 1059   Gross per 24 hour   Intake              400 ml   Output                0 ml   Net              400 ml        Physical Exam:  General:         AAOx3. NAD. Afebrile. Cooperative  HEENT:               NCAT. No obvious deformity. Nares normal. No drainage  Lungs:  CTABL. No wheezing/rhonchi/rales  Cardiovascular:   RRR. No m/r/g. No pedal edema b/l. +2 PT/DT pulses b/l. Abdomen:       S/nt/nd. Bowel sounds normal. .   Skin:         No rashes or lesions. Not Jaundiced  Neurologic:    AAOx3. CN II- XII grossly WNL. No gross focal deficit except R sided weakness. Fasciculations in both upper extremities  Moves all extremities. Normal sensory. Normal gait. Psychiatric:         Euthymic. Normal affect.              DIAGNOSTIC STUDIES      Data Review:   Recent Results (from the past 24 hour(s)) CBC W/O DIFF    Collection Time: 03/24/17  6:32 AM   Result Value Ref Range    WBC 5.2 4.3 - 11.1 K/uL    RBC 3.88 (L) 4.05 - 5.25 M/uL    HGB 11.7 11.7 - 15.4 g/dL    HCT 35.9 35.8 - 46.3 %    MCV 92.5 79.6 - 97.8 FL    MCH 30.2 26.1 - 32.9 PG    MCHC 32.6 31.4 - 35.0 g/dL    RDW 12.7 11.9 - 14.6 %    PLATELET 200 691 - 341 K/uL    MPV 12.1 10.8 - 45.8 FL   METABOLIC PANEL, BASIC    Collection Time: 03/24/17  6:32 AM   Result Value Ref Range    Sodium 143 136 - 145 mmol/L    Potassium 3.9 3.5 - 5.1 mmol/L    Chloride 107 98 - 107 mmol/L    CO2 29 21 - 32 mmol/L    Anion gap 7 7 - 16 mmol/L    Glucose 77 65 - 100 mg/dL    BUN 15 8 - 23 MG/DL    Creatinine 0.68 0.6 - 1.0 MG/DL    GFR est AA >60 >60 ml/min/1.73m2    GFR est non-AA >60 >60 ml/min/1.73m2    Calcium 8.9 8.3 - 10.4 MG/DL   PLEASE READ & DOCUMENT PPD TEST IN 24 HRS    Collection Time: 03/24/17  5:45 PM   Result Value Ref Range    PPD  Negative    mm Induration 0 mm     Imaging /Procedures /Studies:    CT Results  (Last 48 hours)               03/24/17 1705  CT SPINE [facility] W CONT Final result    Impression:  Impression:  Anterior thecal sac indentation, spinal cord displacement at C5-C6   as described above. Multilevel degenerative disc disease. No mass, hematoma, or granuloma associated with the thoracic spinal stimulator. Narrative:  Title:  Cervical and thoracic CT myelogram.       Indication:  Upper extremity radiculopathy, swallowing difficulty, right-sided   weakness, dysphasia, right upper and lower extremity weakness x2 weeks, stroke,   increased right upper and right lower extremity weakness for months that has   gotten worse in the last 2 weeks, increased dysphasia since cervical surgery   last year, history of cervical and lumbar radiculopathy, recent spinal cord   stimulator placement for chronic lumbar pain, weight loss due to poor appetite   and dysphagia.        Technique:  Axial images through cervical and thoracic spine were obtained after   the intrathecal administration of contrast media. Intrathecal contrast was used   to best identify abnormal thecal sac impression. Images were reformatted in the   coronal and axial planes. All CT scans at this facility use automated exposure   control, dose modulation, iterative reconstruction, and/or weight based Ma   and/or kV dosing when appropriate to reduce radiation dose to as low as   reasonably achievable. Comparison:  MRI 2/18/2016. Findings:  Images through the base of the brain are unremarkable. Homogeneous   thyroid gland. Patchy, mosaic, subtle density in both lungs. Atelectasis versus   scarring at the lung bases. The visualized portion of liver, pancreas, spleen,   adrenal glands, and kidneys are unremarkable. Calcified atherosclerotic disease. Tracheal bronchial calcification. Coronary artery calcification. Cervical:       C1-C2:  No spinal stenosis or neural foraminal stenosis. C2-C3:  Mild central anterior thecal sac indentation secondary to disc bulge. C3-C4:  Loss of intervertebral disc height. Anterior thecal sac indentation   secondary to central disc bulge. Mild left neural foraminal narrowing. C4-C5:  Grade 1 anterolisthesis of C4 on C5. Mild anterior thecal sac   indentation secondary to disc bulge. No neural foraminal narrowing. C5-C6:  Anterior fusion with metal hardware. Disc/osteophyte indents the spinal   cord and displaces it posteriorly. However, a rim of CSF remains. Minimal   bilateral neural foraminal narrowing. C6-C7:  Anterior thecal sac flattening secondary to diffuse disc bulge. No   neural foraminal narrowing. C7-T1:  No spinal stenosis or neural foraminal stenosis       Thoracic:       Spinal stimulator wires enter the thecal sac at T9-T10. The stimulator   terminates at T6-T7. No associated granuloma or mass identified. T1-T2:  No spinal stenosis or neural foraminal stenosis. T2-T3:  Small osteophyte results in right posterolateral thecal sac indentation. T3-T4:  No spinal stenosis or neural foraminal stenosis. T4-T5:  No spinal stenosis or neural foraminal stenosis. T5-T6:  No spinal stenosis or neural foraminal stenosis. T6-T7:  No spinal stenosis or neural foraminal stenosis. T7-T8:  No spinal stenosis or neural foraminal stenosis. T8-T9:  Right anterior thecal sac indentation secondary to disc/osteophyte   complex. T9-T10:  Central and right lateral disc bulge results in thecal sac indentation. No spinal stenosis. T10-T11:  Mild bilateral facet overgrowth results in mild thecal sac   indentation. T11-T12:  Left lateral/left posterior disc bulge results in thecal sac   indentation and mild left neural foraminal narrowing. T12-L1:  No spinal stenosis or neural foraminal stenosis. L1-L2:  Bilateral neural foraminal narrowing. Posterior/left lateral disc bulge. No spinal stenosis or neural foraminal stenosis. Although incompletely imaged, there appears to be a fairly significant scoliosis   in the lumbar region. 03/23/17 1532  CT HEAD WO CONT Final result    Impression:  IMPRESSION:  Normal unenhanced CT of the brain for age. No acute intracranial   abnormality. Narrative:  Examination: CT scan of the brain without contrast.       History: cva, right hemiplegia, 68 years Female Complains of right leg and right   arm weakness x 2 weeks. States she believes she had a stroke       Technique: 5 mm axial imaging of the brain from the posterior fossa to the   vertex. Radiation dose reduction techniques were used for this study:  Our CT   scanners use one or all of the following: Automated exposure control, adjustment   of the mA and/or kVp according to patient's size, iterative reconstruction. Comparison:  None available       Findings:   The brain parenchyma appears within normal limits for age.  The   ventricles, sulci are age-appropriate. No intracranial hemorrhage or extra-axial   collection is identified. No evidence of acute infarct. No mass effect or   midline shift is present. Basal cisterns are intact. The visualized paranasal   sinuses and mastoid air cells are clear. The orbits, bones, and soft tissues are   normal in appearance. Labs and Studies from previous 24 hours have been personally reviewed by myself    Kelly Problems    Diagnosis Date Noted    Right sided weakness 03/23/2017    Dysphagia 03/23/2017    Mixed hyperlipidemia     GERD (gastroesophageal reflux disease)      controlled with med         Plan:  R sided weakness. CT myelogram w/o acute pathology. Planned for EMG tomorrow. Check ESR, CK, SPEP, UPEP, RF, ALEKSANDAR -. Dysphagia - EGD with sal duodenal ulcer - will need Barium esophagogram.     Duodenal ulcer: start on PPI. Check HPylori - appreciate GI help. DVT Prophylaxis: Levonox SQ  CODE Status: Full CODE  Plan of Care Discussed with: patient/ .  Care team    Medical Risk: moderate  Disposition:pending    Gaurav Haque MD  03/24/17

## 2017-03-24 NOTE — PROGRESS NOTES
TRANSFER - OUT REPORT:    Verbal report given to Fara DAI(name) on Luciano Apodaca  being transferred to  723(unit) for routine progression of care       Report consisted of patients Situation, Background, Assessment and   Recommendations(SBAR). Information from the following report(s) Procedure Summary and MAR was reviewed with the receiving nurse. Lines:   Peripheral IV 03/24/17 Left Forearm (Active)        Opportunity for questions and clarification was provided.       Patient transported with:   transport

## 2017-03-24 NOTE — PROGRESS NOTES
OT orders received, evaluation attempted. Patient currently off the floor. Will check back as schedule permits.    Barnwellmuna Gómez, OTR/L

## 2017-03-24 NOTE — PROGRESS NOTES
Problem: Dysphagia (Adult)  Goal: *Acute Goals and Plan of Care (Insert Text)  ST. Pt. Will tolerate mechanical soft/no mixed consistencies, thin liquids/no straws, double swallow with no overt s/sx of aspiration/penetration. 2. Pt. Will participate in modified barium swallow with 100% participation to fully evaluate swallow function. LTG: Pt. Will tolerate least restrictive diet without respiratory decline. SPEECH LANGUAGE PATHOLOGY: BEDSIDE SWALLOW NOTE: INITIAL ASSESSMENT     NAME/AGE/GENDER: Jie Lee is a 68 y.o. female  DATE: 3/24/2017  PRIMARY DIAGNOSIS: Dysphagia, unspecified type [R13.10]  Procedure(s) (LRB):  ESOPHAGOGASTRODUODENOSCOPY (EGD) (N/A)  ESOPHAGEAL DILATION (N/A) Day of Surgery  ICD-10: Treatment Diagnosis: R13.13 Dysphagia, pharyngeal Phase  INTERDISCIPLINARY COLLABORATION: Registered Nurse  PRECAUTIONS/ALLERGIES: Lortab [hydrocodone-acetaminophen] and Lunesta [eszopiclone]  ASSESSMENT:   Based on the objective data described below, Ms. Rosa Elena Panda presents with complaints of 15 pound weight loss due to swallowing issues. When asked regarding swallowing issues, patient immediately points to mid pharyngeal region and states \"it gets stuck right here, only since my surgery. \" Patient tolerated ice chip without difficulty. Moderate increased effort noted starting with swallow of tsp thin liquids, audible swallow. Patient states this also is new onset s/p surgical procedure. Tolerated cup sip thin liquids without overt s/sx. Immediate throat clearing with straw sip thin liquids. Patient tolerated applesauce with effortful, audible swallow, double swallow without overt s/sx. Patient with immediate throat clearing with mixed consistency fruit. Adequate mastication of cracker, however when pushed to take larger bite (had only taken tiny edge) patient with significant difficulty. Volitional barking cough in attempt to clear although vocal quality remained clear/able to speak.  Eventually cleared with additional liquid wash. Although the patient demonstrates decreased intakes, does not denote any respiratory issues since surgery. Will need modified barium swallow (first available 3/27) to further identify pharyngeal dysfunction and possible therapeutic tasks or compensatory strategies to improve pharyngeal clearance. Recommend mechanical soft/no mixed consistencies/no breads, thin liquids/no straw when GI ok for upgrade. Medication crushed in applesauce. Patient will benefit from skilled intervention to address the below impairments. ?????? ? ? This section established at most recent assessment??????????  PROBLEM LIST (Impairments causing functional limitations):  1. Moderate pharyngeal dysphagia  REHABILITATION POTENTIAL FOR STATED GOALS: GOOD      PLAN OF CARE:   Patient will benefit from skilled intervention to address the following impairments. RECOMMENDATIONS AND PLANNED INTERVENTIONS (Benefits and precautions of therapy have been discussed with the patient.):  · PO:  Mechanical soft with ground meat/chopped vegetables, no bread, mixed consistencies  · Liquids:  regular thin no straw  MEDICATIONS:  · Crushed in puree  COMPENSATORY STRATEGIES/MODIFICATIONS INCLUDING:  · Alternate liquids/solids  · Small sips and bites  OTHER RECOMMENDATIONS (including follow up treatment recommendations): · Family training/education  · Patient education  RECOMMENDED DIET MODIFICATIONS DISCUSSED WITH:  · Nursing  · Patient  FREQUENCY/DURATION: Continue to follow patient 3 times a week as able or until goals met. RECOMMENDED REHABILITATION/EQUIPMENT: (at time of discharge pending progress):   Continue Skilled Therapy. SUBJECTIVE:   \"It gets stuck right here\"  History of Present Injury/Illness: Ms. Trupti Coleman  has a past medical history of Blood loss anemia; CAD (coronary artery disease) (01/2014); Cervical myelopathy; Cervical spinal stenosis;  Chronic lower back pain; GERD (gastroesophageal reflux disease); H/O methicillin resistant Staphylococcus aureus infection; History of staph infection (2012); Insomnia; Lumbar radiculopathy, acute; Lumbar stenosis without neurogenic claudication; Mixed hyperlipidemia; Nausea & vomiting; Other intervertebral disc degeneration, lumbar region; Primary insomnia; PUD (peptic ulcer disease); Scoliosis, idiopathic, infantile; and Spinal stenosis, cervical region. She also has no past medical history of Unspecified adverse effect of anesthesia. She also  has a past surgical history that includes lap cholecystectomy; appendectomy; bladder repair (2010); hysterectomy (1972); oophorectomy (1999); orthopaedic (01/2015); colonoscopy (01/12/2016); endoscopy (10/2015); angioplasty; tumor removal (at age of 40); cervical fusion (07/2016); cataract removal (Bilateral); vascular surgery procedure unlist; cardiac surg procedure unlist; and endoscopy (11/02/2015). Present Symptoms:    Pain Intensity 1: 0  Current Medications:   No current facility-administered medications on file prior to encounter. Current Outpatient Prescriptions on File Prior to Encounter   Medication Sig Dispense Refill    simvastatin (ZOCOR) 40 mg tablet Take 1 Tab by mouth nightly. Indications: hypercholesterolemia 90 Tab 3    oxyCODONE-acetaminophen (PERCOCET 7.5) 7.5-325 mg per tablet Take 1 Tab by mouth every eight (8) hours as needed for Pain. Max Daily Amount: 3 Tabs. 30 Tab 0    cholecalciferol (VITAMIN D3) 1,000 unit tablet Take 1,000 Units by mouth daily (after lunch).  mirtazapine (REMERON) 15 mg tablet Take 1 Tab by mouth nightly. Patient needs an Office visit for further refill 90 Tab 3    omeprazole (PRILOSEC) 20 mg capsule Take 1 Cap by mouth every morning. Indications: GASTROESOPHAGEAL REFLUX 90 Cap 1    traMADol (ULTRAM) 50 mg tablet Take 1 Tab by mouth every eight (8) hours as needed for Pain. Max Daily Amount: 150 mg. 90 Tab 3    biotin 10,000 mcg cap Take  by mouth daily (after lunch).  LOTEMAX 0.5 % ophthalmic suspension Administer 1 Drop to both eyes two (2) times a day.  aspirin 81 mg chewable tablet Take 81 mg by mouth nightly.  multivitamin (ONE A DAY) tablet Take 1 Tab by mouth daily (after lunch). Current Dietary Status:  Clear liquids           History of reflux:  YES   · Reflux medication:prilosec  Social History/Home Situation:    Home Environment: Private residence  One/Two Story Residence: One story  Living Alone: No  Support Systems: Child(alex), Spouse/Significant Other/Partner  Patient Expects to be Discharged to[de-identified] Unknown  Current DME Used/Available at Home: Walker      OBJECTIVE:   Respiratory Status:  Nasal cannula     CXR Results:none noted  MRI/CT Results:IMPRESSION: Normal unenhanced CT of the brain for age. No acute intracranial  abnormality. Oral Motor Structure/Speech:  Oral-Motor Structure/Motor Speech  Labial: No impairment  Dentition: Upper & lower dentures  Oral Hygiene: good  Lingual: No impairment     Cognitive and Communication Status:  Neurologic State: Alert  Orientation Level: Oriented X4  Cognition: Appropriate for age attention/concentration  Perception: Appears intact  Perseveration: No perseveration noted  Safety/Judgement: Awareness of environment     BEDSIDE SWALLOW EVALUATION  Oral Assessment:  Oral Assessment  Labial: No impairment  Dentition: Upper & lower dentures  Oral Hygiene: good  Lingual: No impairment  P.O. Trials:  Patient Position: upright in bed     The patient was given bite/sip amounts of the following:   Consistency Presented: Thin liquid; Solid;Puree;Mixed consistency; Ice chips  How Presented: Self-fed/presented;SLP-fed/presented;Cup/sip;Spoon;Straw     ORAL PHASE:  Bolus Acceptance: No impairment  Bolus Formation/Control: No impairment  Propulsion: No impairment     Oral Residue: None     PHARYNGEAL PHASE:  Initiation of Swallow: Delayed (# of seconds)  Laryngeal Elevation: Decreased  Aspiration Signs/Symptoms: Clear throat;Delayed cough/throat clear;Strong cough; Increase in RR  Vocal Quality: Low volume  Cues for Modifications: Moderate  Effective Modifications: Alternate liquids/solids; Double swallow     Pharyngeal Phase Characteristics: Suspected pharyngeal residue;Multiple swallows     OTHER OBSERVATIONS:  Rate/bite size: Impaired   Endurance:  Impaired      Comments: due to dysphagia/fear      Tool Used: Dysphagia Outcome and Severity Scale (AKILAH)     Score Comments   Normal Diet  [ ] 7 With no strategies or extra time needed   Functional Swallow  [ ] 6 May have mild oral or pharyngeal delay         Mild Dysphagia     [ ] 5 Which may require one diet consistency restricted (those who demonstrate penetration which is entirely cleared on MBS would be included)   Mild-Moderate Dysphagia  [ ] 4 With 1-2 diet consistencies restricted         Moderate Dysphagia  [X] 3 With 2 or more diet consistencies restricted         Moderately Severe Dysphagia  [ ] 2 With partial PO strategies (trials with ST only)         Severe Dysphagia  [ ] 1 With inability to tolerate any PO safely            Score:  Initial: 3 Most Recent: X (Date: -- )   Interpretation of Tool: The Dysphagia Outcome and Severity Scale (AKILAH) is a simple, easy-to-use, 7-point scale developed to systematically rate the functional severity of dysphagia based on objective assessment and make recommendations for diet level, independence level, and type of nutrition.        Score 7 6 5 4 3 2 1   Modifier CH CI CJ CK CL CM CN   · Swallowing:               - CURRENT STATUS:           CL - 60%-79% impaired, limited or restricted               - GOAL STATUS:                   CJ - 20%-39% impaired, limited or restricted               - D/C STATUS:                       ---------------To be determined---------------  [place] / Plan[place] B / Product Type: Medicare /       TREATMENT:         (In addition to Assessment/Re-Assessment sessions the following treatments were rendered)  Assessment/Reassessment only, no treatment provided today  MODALITIES:                                                                     ORAL MOTOR  EXERCISES:                                                                                                                                                                       LARYNGEAL / PHARYNGEAL EXERCISES:                                                                                                                                      __________________________________________________________________________________________________  Safety:   After treatment position/precautions:  · Call light within reach  · RN notified  · Upright in Bed  Treatment Assessment:  Dysphagia noted. Progression/Medical Necessity:   · Skilled intervention continues to be required due to persistent signs and symptoms of aspiration and patient still consuming a modified diet. Compliance with Program/Exercises: Will assess as treatment progresses. Reason for Continuation of Services/Other Comments:  · Patient continues to require skilled intervention due to medical complications. Recommendations/Intent for next treatment session: \"Treatment next visit will focus on advancements to more challenging activities and modified barium swallow study\". Total Treatment Duration:  Time In: 1401  Time Out: One Sonya Santiago.  [place], MS, CCC-SLP

## 2017-03-24 NOTE — PROGRESS NOTES
TRANSFER - IN REPORT:    Verbal report received from [facility] on Megan Land  being received from GI Lab for routine progression of care      Report consisted of patients Situation, Background, Assessment and   Recommendations(SBAR). Information from the following report(s) SBAR, Kardex and Procedure Summary was reviewed with the receiving nurse. Opportunity for questions and clarification was provided. Assessment to be completed upon patients arrival to unit and care assumed at that time.

## 2017-03-24 NOTE — PROGRESS NOTES
Physical Therapy Note:    Orders received, chart reviewed. Attempted PT evaluation this AM, patient currently off the floor for testing. Will attempt at a later time pending patient available.     Thank you,  BRADLY MaganaT

## 2017-03-24 NOTE — H&P
Date of Surgery Update: Ingrid Austin was seen and examined. History and physical has been reviewed. The patient has been examined.  There have been no significant clinical changes since the completion of the originally dated History and Physical.    Signed By: Aime Lujan MD     March 24, 2017 10:42 AM

## 2017-03-24 NOTE — ANESTHESIA PREPROCEDURE EVALUATION
Anesthetic History               Review of Systems / Medical History  Patient summary reviewed, nursing notes reviewed and pertinent labs reviewed    Pulmonary                   Neuro/Psych              Cardiovascular              CAD    Exercise tolerance: >4 METS  Comments: Cardiac angioplasty   GI/Hepatic/Renal     GERD: well controlled           Endo/Other             Other Findings            Physical Exam    Airway  Mallampati: II  TM Distance: 4 - 6 cm  Neck ROM: normal range of motion   Mouth opening: Normal     Cardiovascular  Regular rate and rhythm,  S1 and S2 normal,  no murmur, click, rub, or gallop             Dental  No notable dental hx  Dentition: Full upper dentures and Lower partial plate     Pulmonary  Breath sounds clear to auscultation               Abdominal         Other Findings            Anesthetic Plan    ASA: 3  Anesthesia type: total IV anesthesia          Induction: Intravenous  Anesthetic plan and risks discussed with: Patient

## 2017-03-24 NOTE — PROGRESS NOTES
TRANSFER - OUT REPORT:    Verbal report given to Fara handy(name) on Adwoa Montgomery  being transferred to 723(unit) for routine progression of care       Report consisted of patients Situation, Background, Assessment and   Recommendations(SBAR). Information from the following report(s) Procedure Summary was reviewed with the receiving nurse. Lines:   Peripheral IV 03/23/17 Left Antecubital (Active)   Site Assessment Clean, dry, & intact 3/24/2017  9:30 AM   Phlebitis Assessment 0 3/24/2017  9:30 AM   Infiltration Assessment 0 3/24/2017  9:30 AM   Dressing Status Clean, dry, & intact 3/24/2017  9:30 AM   Dressing Type Tape;Transparent 3/24/2017  9:30 AM   Hub Color/Line Status Pink; Infusing 3/24/2017  9:30 AM   Alcohol Cap Used No 3/24/2017  7:41 AM        Opportunity for questions and clarification was provided.       Patient transported with:

## 2017-03-24 NOTE — PROGRESS NOTES
TRANSFER - OUT REPORT:    Verbal report given to Isabella DAI on Barrera Burnham  being transferred to GI Lab for routine progression of care       Report consisted of patients Situation, Background, Assessment and   Recommendations(SBAR). Information from the following report(s) SBAR, Kardex and Intake/Output was reviewed with the receiving nurse. Lines:   Peripheral IV 03/23/17 Left Antecubital (Active)   Site Assessment Clean, dry, & intact 3/23/2017  8:03 PM   Phlebitis Assessment 0 3/23/2017  8:03 PM   Infiltration Assessment 0 3/23/2017  8:03 PM   Dressing Status Clean, dry, & intact 3/23/2017  8:03 PM   Dressing Type Tape;Transparent 3/23/2017  8:03 PM   Hub Color/Line Status Flushed 3/23/2017  5:05 PM        Opportunity for questions and clarification was provided.

## 2017-03-24 NOTE — PROGRESS NOTES
SPEECH PATHOLOGY NOTE:    Orders received for bedside swallowing assessment. Patient off the floor for EGD. Will follow s/p procedure.     Dimitrios Mota MS, SLP

## 2017-03-24 NOTE — PROCEDURES
Endoscopic Gastroduodenoscopy Procedure Note    Indications: dysphagia, worse after cervical spine surgery     Anesthesia/Sedation: MAC IV          Procedure Details     Informed consent was obtained for the procedure, including conscious sedation. Risks of infection, perforation, hemorrhage, adverse drug reaction and aspiration were discussed. The patient was placed in the left lateral decubitus position. She was monitored continuously with ECG tracing, pulse oximetry, blood pressure monitoring, and direct observation. The [id number] gastroscope was inserted into the mouth and advanced under direct vision to the second portion of the duodenum. A careful inspection was made as the gastroscope was withdrawn, including a retroflexed view of the proximal stomach; findings and interventions are described below. Appropriate photodocumentation was obtained. Findings: The esophagus appeared normal. No masses, strictures, or rings were apparent. There was a small hiatal hernia. The gastric mucosa appeared normal. There was a small sub-centimeter ulcer in the duodenal bulb. Due to history, the esophagus was empirically dilated using Savary starting at 15 mm up to 16 mm. No resistance was met when passing the dilator. Specimens: none     Estimated Blood Loss: none           Complications:   None; patient tolerated the procedure well. Attending Attestation:  I performed the procedure. Impression:    1. Small duodenal ulcer  2. Normal appearing esophagus  3. S/P empiric esophageal dilation up to 16 mm with no resistance     Recommendations:  1. Follow up with inpatient team   2. PPI  3. Consider modified barium esophagram for evaluation of oropharyngeal dysphagia   4.  Will check serology for H. Pylori, treat if positive

## 2017-03-24 NOTE — PROCEDURES
Department of Interventional Radiology  [phone]        Interventional Radiology Brief Procedure Note    Patient: Barrera Burnham MRN: [id number]  SSN: [id number]    YOB: 1940  Age: 68 y.o. Sex: female      Date of Procedure: 3/24/2017    Pre-Procedure Diagnosis: Right sided weakness, dysphagia    Post-Procedure Diagnosis: SAME    Procedure(s): Myelogram    Brief Description of Procedure: fluoro guided LP @ L4/5, contrast injected, needle removed    Performed By: Rafal Matta PA-C     Assistants: None    Anesthesia:None    Estimated Blood Loss: None    Specimens: None    Implants: None    Findings: See dictated report.      Complications: None    Recommendations: bedrest, CT     Follow Up: referring MD    Signed By: Rafal Matta PA-C     March 24, 2017

## 2017-03-24 NOTE — PROGRESS NOTES
Interventional Radiology Prep Area Handoff      Allergies   Allergen Reactions    Lortab [Hydrocodone-Acetaminophen] Unknown (comments)    Lunesta [Eszopiclone] Other (comments)     Nightmares         IRSummary reviewed. Pt identified with two identifiers.    Verified procedure with Patient and IR Provider as cervical .    Consent obtained: yes H&P complete/updated: yes MD airway complete: n/a    Sedation:no   NPO: n/a   Anticoagulation: no     Pt shaved: n/a   Antibiotics: n/a  Anesthesia notified: n/a    Additional Notes: n/a      Lines:  Peripherally Inserted Central Catheter:     Central Venous Catheter:     Venous Access Device:     Peripheral Intravenous Line:  Peripheral IV 03/24/17 Left Forearm (Active)     Hemodialysis Catheter:     Drain(s):       Labs verified: yes  Recent Results (from the past 24 hour(s))   DRUG SCREEN, URINE    Collection Time: 03/23/17  5:10 PM   Result Value Ref Range    PCP(PHENCYCLIDINE) NEGATIVE       BENZODIAZEPINE NEGATIVE       COCAINE NEGATIVE       AMPHETAMINE NEGATIVE       METHADONE NEGATIVE       THC (TH-CANNABINOL) NEGATIVE       OPIATES NEGATIVE       BARBITURATES NEGATIVE      URINALYSIS W/ RFLX MICROSCOPIC    Collection Time: 03/23/17  5:10 PM   Result Value Ref Range    Color YELLOW      Appearance CLEAR      Specific gravity 1.008 1.001 - 1.023      pH (UA) 6.0 5.0 - 9.0      Protein NEGATIVE  NEG mg/dL    Glucose NEGATIVE  mg/dL    Ketone NEGATIVE  NEG mg/dL    Bilirubin NEGATIVE  NEG      Blood NEGATIVE  NEG      Urobilinogen 0.2 0.2 - 1.0 EU/dL    Nitrites NEGATIVE  NEG      Leukocyte Esterase NEGATIVE  NEG     CBC W/O DIFF    Collection Time: 03/24/17  6:32 AM   Result Value Ref Range    WBC 5.2 4.3 - 11.1 K/uL    RBC 3.88 (L) 4.05 - 5.25 M/uL    HGB 11.7 11.7 - 15.4 g/dL    HCT 35.9 35.8 - 46.3 %    MCV 92.5 79.6 - 97.8 FL    MCH 30.2 26.1 - 32.9 PG    MCHC 32.6 31.4 - 35.0 g/dL    RDW 12.7 11.9 - 14.6 %    PLATELET 598 784 - 188 K/uL    MPV 12.1 10.8 - 14.1 FL METABOLIC PANEL, BASIC    Collection Time: 03/24/17  6:32 AM   Result Value Ref Range    Sodium 143 136 - 145 mmol/L    Potassium 3.9 3.5 - 5.1 mmol/L    Chloride 107 98 - 107 mmol/L    CO2 29 21 - 32 mmol/L    Anion gap 7 7 - 16 mmol/L    Glucose 77 65 - 100 mg/dL    BUN 15 8 - 23 MG/DL    Creatinine 0.68 0.6 - 1.0 MG/DL    GFR est AA >60 >60 ml/min/1.73m2    GFR est non-AA >60 >60 ml/min/1.73m2    Calcium 8.9 8.3 - 10.4 MG/DL     Patient Vitals for the past 8 hrs:   Temp Pulse Resp BP SpO2   03/24/17 1430 98 °F (36.7 °C) 70 16 153/70 97 %   03/24/17 1406 97.4 °F (36.3 °C) 76 16 121/59 97 %   03/24/17 1124 - 65 - 130/61 99 %   03/24/17 1114 - 71 - 104/58 100 %   03/24/17 1105 - 73 - 92/54 98 %   03/24/17 1101 97 °F (36.1 °C) 77 12 92/54 98 %   03/24/17 0922 - 67 18 144/67 95 %   03/24/17 0744 97.5 °F (36.4 °C) 71 18 119/65 94 %

## 2017-03-24 NOTE — PROGRESS NOTES
TRANSFER - IN REPORT:    Verbal report received from SUHAS Hogan (name) on Valerie Cesar  being received from room 723 (unit) for ordered procedure. Report consisted of patients Situation, Background, Assessment and   Recommendations(SBAR). Information from the following report(s) SBAR was reviewed with the receiving nurse. Opportunity for questions and clarification was provided. Awaiting transport to bring pt to the GI Lab at this time.

## 2017-03-24 NOTE — PROGRESS NOTES
TRANSFER - IN REPORT:    Verbal report received from Pau DAI on Herrera Torres  being received from IR/CT for routine progression of care      Report consisted of patients Situation, Background, Assessment and   Recommendations(SBAR). Information from the following report(s) SBAR, Kardex, Procedure Summary and MAR was reviewed with the receiving nurse. Opportunity for questions and clarification was provided. Assessment to be completed upon patients arrival to unit and care assumed at that time.

## 2017-03-24 NOTE — ANESTHESIA POSTPROCEDURE EVALUATION
Post-Anesthesia Evaluation and Assessment    Patient: Andriy Singh MRN: [id number]  SSN: [id number]    YOB: 1940  Age: 68 y.o. Sex: female       Cardiovascular Function/Vital Signs  Visit Vitals    BP 92/54    Pulse 77    Temp 36.1 °C (97 °F)    Resp 12    Ht 5' 2\" (1.575 m)    Wt 47.2 kg (104 lb)    SpO2 98%    BMI 19.02 kg/m2       Patient is status post total IV anesthesia anesthesia for Procedure(s):  ESOPHAGOGASTRODUODENOSCOPY (EGD)  ESOPHAGEAL DILATION. Nausea/Vomiting: None    Postoperative hydration reviewed and adequate. Pain:  Pain Scale 1: Numeric (0 - 10) (03/24/17 0922)  Pain Intensity 1: 0 (03/24/17 9554)   Managed    Neurological Status:   Neuro  Neurologic State: Alert (03/23/17 2003)  Orientation Level: Oriented X4 (03/23/17 2003)  Cognition: Follows commands; Appropriate decision making; Appropriate for age attention/concentration (03/23/17 2003)  Speech: Clear (03/23/17 2003)  Assessment L Pupil: Round;Brisk (03/23/17 2003)  Size L Pupil (mm): 2 (03/23/17 1705)  Assessment R Pupil: Round;Brisk (03/23/17 2003)  Size R Pupil (mm): 2 (03/23/17 1705)  LUE Motor Response: Purposeful (03/23/17 2003)  LLE Motor Response: Weak (03/24/17 0741)  RUE Motor Response:  unequal L greater than R (03/23/17 2003)  RLE Motor Response: Weak (03/24/17 0741)   At baseline    Mental Status and Level of Consciousness: Arousable    Pulmonary Status:   O2 Device: Nasal cannula (03/24/17 1101)   Adequate oxygenation and airway patent    Complications related to anesthesia: None    Post-anesthesia assessment completed.  No concerns    Signed By: Daisy Ferrari MD     March 24, 2017

## 2017-03-24 NOTE — PROGRESS NOTES
Problem: Interdisciplinary Rounds  Goal: Interdisciplinary Rounds  Interdisciplinary team rounds were held 3/24/2017 with the following team members:Care Management, Nursing, Occupational Therapy, Pastoral Care, Physical Therapy and Physician. Plan of care discussed. See clinical pathway and/or care plan for interventions and desired outcomes.

## 2017-03-24 NOTE — PROGRESS NOTES
Call received from IR; they will perform CT myelogram previously ordered under Radiology. Order changed to reflect IR consult.

## 2017-03-24 NOTE — PROGRESS NOTES
Problem: Mobility Impaired (Adult and Pediatric)  Goal: *Acute Goals and Plan of Care (Insert Text)  STG:  (1.)Ms. Brenda Lipscomb will move from supine to sit and sit to supine , scoot up and down and roll side to side with STAND BY ASSIST within 3 day(s). (2.)Ms. Brenda Lipscomb will transfer from bed to chair and chair to bed with STAND BY ASSIST using the least restrictive device within 3 day(s). (3.)Ms. Martinez will ambulate with STAND BY ASSIST for 50+ feet with the least restrictive device within 3 day(s). (4.)Ms. Martinez will perform LE exercises with 1 to 2 cues for form within 3 days to improve strength for functional transfers and ambulation. (5.)Ms. Brenda Lipscomb will perform standing static and dynamic balance activities x 10 minutes with MINIMAL ASSIST to improve safety within 3 day(s). LTG:  (1.)Ms. Brenda Lipscomb will move from supine to sit and sit to supine , scoot up and down and roll side to side in bed with SUPERVISION within 7 day(s). (2.)Ms. Brenda Lipscomb will transfer from bed to chair and chair to bed with SUPERVISION using the least restrictive device within 7 day(s). (3.)Ms. Martinez will ambulate with SUPERVISION for 100 feet with the least restrictive device within 7 day(s). (4.)Ms. Martinez will ascend and descend 5 stairs using L hand rail(s) with STAND BY ASSIST to improve functional mobility and safety within 7 day(s).     ________________________________________________________________________________________________      PHYSICAL THERAPY: INITIAL ASSESSMENT, PM 3/24/2017  OBSERVATION: Hospital Day: 2  [place] / [place] / Product Type: Medicare /      NAME/AGE/GENDER: Neto Bryant is a 68 y.o. female    PRIMARY DIAGNOSIS: Dysphagia, unspecified type [R13.10] Right sided weakness Right sided weakness  Procedure(s) (LRB):  ESOPHAGOGASTRODUODENOSCOPY (EGD) (N/A)  ESOPHAGEAL DILATION (N/A)  Day of Surgery  ICD-10: Treatment Diagnosis:       · Difficulty in walking, Not elsewhere classified (R26.2)   Precaution/Allergies:  Lortab [hydrocodone-acetaminophen] and Lunesta [eszopiclone]       ASSESSMENT:      Ms. Signe Cockayne is a 68 y.o. female with dysphagia and R sided weakness. She lives with  in 2 story home on first floor and reports ambulating with rollator walker at baseline. Recent spinal stimulator placement. She also reports RLE weakness including drop foot on R side. She was agreeable to therapy, transferred to sitting with minimal assist, stood with CGA and exhibited severe thoracic kyphosis and forward trunk flexion. Drop foot to RLE noted and markedly decreased strength noted to RLE grossly 3/5 except dorsiflexion strength. She fatigued quickly with ambulation 15' and then returned to supine, as she has another procedure this afternoon. Very pleasant and seems motivated. Olesya Mallory is currently functioning below her baseline and would benefit from skilled PT during acute care stay to maximize safety and independence with functional mobility. Ms. Signe Cockayne was discharged from our facility before further treatment could be provided in this setting. This section established at most recent assessment   PROBLEM LIST (Impairments causing functional limitations):  1. Decreased Strength  2. Decreased ADL/Functional Activities  3. Decreased Transfer Abilities  4. Decreased Ambulation Ability/Technique  5. Decreased Balance  6. Decreased Activity Tolerance  7. Decreased Pride with Home Exercise Program    INTERVENTIONS PLANNED: (Benefits and precautions of physical therapy have been discussed with the patient.)  1. Balance Exercise  2. Bed Mobility  3. Family Education  4. Gait Training  5. Home Exercise Program (HEP)  6. Therapeutic Activites  7. Therapeutic Exercise/Strengthening  8. Transfer Training  9. Patient Education  10.  Group Therapy      TREATMENT PLAN: Frequency/Duration: 3 times a week for duration of hospital stay  Rehabilitation Potential For Stated Goals: GOOD      RECOMMENDED REHABILITATION/EQUIPMENT: (at time of discharge pending progress): Continue Skilled Therapy. HISTORY:   History of Present Injury/Illness (Reason for Referral):  Per MD Note: \"Ms. Margaret Child is a pleasant 69 yo F who complains of increased RUE and RLE extremity weakness for months that is getting worse in the last 2 weeks. Using a walker. Complains also of increased dysphagia Since her cervical surgery last year. Has extensive PMHX with cervical and lumbar radiculopathy, recent spinal cord stimulator for chronic lumbar pain laced on 2/6/2017. Complains of increased weight loss due to lack of appetite and dysphagia. Hx of esophageal dilatation 15 years prior, never follow-up after that. Will get CT myelogram, SP for swallowing study, neurology and GI consult - appreciate the help. Patient independently seen and examined. Agree with documentation as above with the changes noted in the addendum. \"  Past Medical History/Comorbidities:   Ms. Margaret Child  has a past medical history of Blood loss anemia; CAD (coronary artery disease) (01/2014); Cervical myelopathy; Cervical spinal stenosis; Chronic lower back pain; GERD (gastroesophageal reflux disease); H/O methicillin resistant Staphylococcus aureus infection; History of staph infection (2012); Insomnia; Lumbar radiculopathy, acute; Lumbar stenosis without neurogenic claudication; Mixed hyperlipidemia; Nausea & vomiting; Other intervertebral disc degeneration, lumbar region; Primary insomnia; PUD (peptic ulcer disease); Scoliosis, idiopathic, infantile; and Spinal stenosis, cervical region. She also has no past medical history of Unspecified adverse effect of anesthesia.   Ms. Margaret Child  has a past surgical history that includes lap cholecystectomy; appendectomy; bladder repair (2010); hysterectomy (1972); oophorectomy (1999); orthopaedic (01/2015); colonoscopy (01/12/2016); endoscopy (10/2015); angioplasty; tumor removal (at age of 37); cervical fusion (07/2016); cataract removal (Bilateral); vascular surgery procedure unlist; cardiac surg procedure unlist; and endoscopy (11/02/2015). Social History/Living Environment:   Home Environment: Private residence  # Steps to Enter: 5  Rails to Enter: Yes  Hand Rails : Left  One/Two Story Residence: Two story, live on 1st floor  Living Alone: No  Support Systems: Child(alex), Spouse/Significant Other/Partner  Patient Expects to be Discharged to[de-identified] Unknown  Current DME Used/Available at Home: Walker, rollator, Walker, rolling, Cane, straight  Prior Level of Function/Work/Activity:  Patient ambulated with rollator walker at baseline      Number of Personal Factors/Comorbidities that affect the Plan of Care: 3+: HIGH COMPLEXITY   EXAMINATION:   Most Recent Physical Functioning:   Gross Assessment:  Strength: Generally decreased, functional (RLE grossly 3/5, except ankle dorsiflexion 1/5)  Coordination: Generally decreased, functional  Sensation: Intact               Posture:  Posture (WDL): Exceptions to WDL  Posture Assessment: Forward head, Rounded shoulders, Kyphosis, Trunk flexion  Balance:  Sitting: Intact  Standing: Impaired  Standing - Static: Fair  Standing - Dynamic : Fair Bed Mobility:  Supine to Sit: Minimum assistance  Sit to Supine: Minimum assistance  Scooting: Contact guard assistance  Wheelchair Mobility:     Transfers:  Sit to Stand: Contact guard assistance  Stand to Sit: Contact guard assistance  Gait:     Base of Support: Center of gravity altered;Narrowed  Speed/Ann: Slow;Shuffled;Pace decreased (<100 feet/min)  Step Length: Right shortened;Left shortened  Stance: Left increased;Right increased  Gait Abnormalities: Decreased step clearance; Foot drop;Trunk sway increased  Distance (ft): 15 Feet (ft)  Assistive Device: Walker, rolling  Ambulation - Level of Assistance: Contact guard assistance  Interventions: Manual cues; Safety awareness training;Verbal cues; Visual/Demos       Body Structures Involved:  1. Nerves  2. Bones  3. Joints  4. Muscles  5. Ligaments Body Functions Affected:  1. Sensory/Pain  2. Neuromusculoskeletal  3. Movement Related Activities and Participation Affected:  1. Mobility  2. Self Care  3. Domestic Life  4. Interpersonal Interactions and Relationships  5. Community, Social and [place]   Number of elements that affect the Plan of Care: 4+: HIGH COMPLEXITY   CLINICAL PRESENTATION:   Presentation: Evolving clinical presentation with changing clinical characteristics: MODERATE COMPLEXITY   CLINICAL DECISION MAKIN [facility] Inpatient Short Form  How much difficulty does the patient currently have. .. Unable A Lot A Little None   1. Turning over in bed (including adjusting bedclothes, sheets and blankets)? [ ] 1   [ ] 2   [X] 3   [ ] 4   2. Sitting down on and standing up from a chair with arms ( e.g., wheelchair, bedside commode, etc.)   [ ] 1   [ ] 2   [X] 3   [ ] 4   3. Moving from lying on back to sitting on the side of the bed? [ ] 1   [ ] 2   [X] 3   [ ] 4   How much help from another person does the patient currently need. .. Total A Lot A Little None   4. Moving to and from a bed to a chair (including a wheelchair)? [ ] 1   [ ] 2   [X] 3   [ ] 4   5. Need to walk in hospital room? [ ] 1   [ ] 2   [X] 3   [ ] 4   6. Climbing 3-5 steps with a railing? [ ] 1   [X] 2   [ ] 3   [ ] 4   © , Trustees of [address] [place], [place] [address], under license to [employer]. All rights reserved    Score:  Initial: 17 Most Recent: X (Date: -- )     Interpretation of Tool:  Represents activities that are increasingly more difficult (i.e. Bed mobility, Transfers, Gait).        Score 24 23 22-20 19-15 14-10 9-7 6       Modifier CH CI CJ CK CL CM CN         · Mobility - Walking and Moving Around:               - CURRENT STATUS:    CK - 40%-59% impaired, limited or restricted               - GOAL STATUS:           CJ - 20%-39% impaired, limited or restricted               - D/C STATUS:                       CK - 40%-59% impaired, limited or restricted  [place] / [place] / Product Type: Medicare /       Medical Necessity:     · Patient demonstrates good rehab potential due to higher previous functional level. Reason for Services/Other Comments:  · Patient continues to require modification of therapeutic interventions to increase complexity of exercises. Use of outcome tool(s) and clinical judgement create a POC that gives a: Questionable prediction of patient's progress: MODERATE COMPLEXITY                 TREATMENT:   (In addition to Assessment/Re-Assessment sessions the following treatments were rendered)   Pre-treatment Symptoms/Complaints:  none  Pain: Initial:   Pain Intensity 1: 0  Post Session:  No increased pain, but fatigue noted. Assessment/Reassessment only, no treatment provided today     Braces/Orthotics/Lines/Etc:   · O2 Device: Room air  Treatment/Session Assessment:    · Response to Treatment:  Patient tolerated treatment well with fatigue noted. · Interdisciplinary Collaboration:  · Physical Therapist  · Registered Nurse  · After treatment position/precautions:  · Supine in bed  · Bed/Chair-wheels locked  · Bed in low position  · Call light within reach  · RN notified  · Compliance with Program/Exercises: Will assess as treatment progresses. · Recommendations/Intent for next treatment session: \"Next visit will focus on advancements to more challenging activities and reduction in assistance provided\".   Total Treatment Duration:  PT Patient Time In/Time Out  Time In: 1347  Time Out: 1459     Rosetta Keyes DPT

## 2017-03-25 PROBLEM — G12.21 ALS (AMYOTROPHIC LATERAL SCLEROSIS) (HCC): Status: ACTIVE | Noted: 2017-03-25

## 2017-03-25 LAB
ALBUMIN SERPL BCP-MCNC: 3.4 G/DL (ref 3.2–4.6)
ALBUMIN/GLOB SERPL: 1 {RATIO} (ref 1.2–3.5)
ALP SERPL-CCNC: 73 U/L (ref 50–136)
ALT SERPL-CCNC: 20 U/L (ref 12–65)
ANION GAP BLD CALC-SCNC: 7 MMOL/L (ref 7–16)
AST SERPL W P-5'-P-CCNC: 25 U/L (ref 15–37)
BILIRUB SERPL-MCNC: 0.4 MG/DL (ref 0.2–1.1)
BUN SERPL-MCNC: 10 MG/DL (ref 8–23)
CALCIUM SERPL-MCNC: 8.9 MG/DL (ref 8.3–10.4)
CHLORIDE SERPL-SCNC: 109 MMOL/L (ref 98–107)
CK SERPL-CCNC: 117 U/L (ref 21–215)
CO2 SERPL-SCNC: 27 MMOL/L (ref 21–32)
CREAT SERPL-MCNC: 0.64 MG/DL (ref 0.6–1)
ERYTHROCYTE [DISTWIDTH] IN BLOOD BY AUTOMATED COUNT: 12.6 % (ref 11.9–14.6)
ERYTHROCYTE [SEDIMENTATION RATE] IN BLOOD: 30 MM/HR (ref 0–30)
GLOBULIN SER CALC-MCNC: 3.4 G/DL (ref 2.3–3.5)
GLUCOSE SERPL-MCNC: 77 MG/DL (ref 65–100)
HCT VFR BLD AUTO: 36.1 % (ref 35.8–46.3)
HGB BLD-MCNC: 12.1 G/DL (ref 11.7–15.4)
MCH RBC QN AUTO: 30.3 PG (ref 26.1–32.9)
MCHC RBC AUTO-ENTMCNC: 33.5 G/DL (ref 31.4–35)
MCV RBC AUTO: 90.5 FL (ref 79.6–97.8)
MM INDURATION POC: NORMAL MM (ref 0–5)
PHOSPHATE SERPL-MCNC: 3.1 MG/DL (ref 2.3–3.7)
PLATELET # BLD AUTO: 207 K/UL (ref 150–450)
PMV BLD AUTO: 12.1 FL (ref 10.8–14.1)
POTASSIUM SERPL-SCNC: 3.6 MMOL/L (ref 3.5–5.1)
PPD POC: NEGATIVE NEGATIVE
PROT SERPL-MCNC: 6.8 G/DL (ref 6.3–8.2)
RBC # BLD AUTO: 3.99 M/UL (ref 4.05–5.25)
SODIUM SERPL-SCNC: 143 MMOL/L (ref 136–145)
WBC # BLD AUTO: 5.4 K/UL (ref 4.3–11.1)

## 2017-03-25 PROCEDURE — 82550 ASSAY OF CK (CPK): CPT | Performed by: HOSPITALIST

## 2017-03-25 PROCEDURE — 97166 OT EVAL MOD COMPLEX 45 MIN: CPT

## 2017-03-25 PROCEDURE — 74011250636 HC RX REV CODE- 250/636: Performed by: HOSPITALIST

## 2017-03-25 PROCEDURE — 85027 COMPLETE CBC AUTOMATED: CPT | Performed by: HOSPITALIST

## 2017-03-25 PROCEDURE — 74011250637 HC RX REV CODE- 250/637

## 2017-03-25 PROCEDURE — 84165 PROTEIN E-PHORESIS SERUM: CPT | Performed by: HOSPITALIST

## 2017-03-25 PROCEDURE — 86038 ANTINUCLEAR ANTIBODIES: CPT | Performed by: HOSPITALIST

## 2017-03-25 PROCEDURE — 85652 RBC SED RATE AUTOMATED: CPT | Performed by: HOSPITALIST

## 2017-03-25 PROCEDURE — 86334 IMMUNOFIX E-PHORESIS SERUM: CPT | Performed by: HOSPITALIST

## 2017-03-25 PROCEDURE — 84100 ASSAY OF PHOSPHORUS: CPT | Performed by: HOSPITALIST

## 2017-03-25 PROCEDURE — 36415 COLL VENOUS BLD VENIPUNCTURE: CPT | Performed by: HOSPITALIST

## 2017-03-25 PROCEDURE — 74011250637 HC RX REV CODE- 250/637: Performed by: HOSPITALIST

## 2017-03-25 PROCEDURE — G8987 SELF CARE CURRENT STATUS: HCPCS

## 2017-03-25 PROCEDURE — 80053 COMPREHEN METABOLIC PANEL: CPT | Performed by: HOSPITALIST

## 2017-03-25 PROCEDURE — 99218 HC RM OBSERVATION: CPT

## 2017-03-25 PROCEDURE — G8988 SELF CARE GOAL STATUS: HCPCS

## 2017-03-25 PROCEDURE — 86430 RHEUMATOID FACTOR TEST QUAL: CPT | Performed by: HOSPITALIST

## 2017-03-25 RX ADMIN — ACETAMINOPHEN 650 MG: 325 TABLET, FILM COATED ORAL at 18:37

## 2017-03-25 RX ADMIN — Medication 10 ML: at 21:54

## 2017-03-25 RX ADMIN — MIRTAZAPINE 15 MG: 15 TABLET, FILM COATED ORAL at 21:50

## 2017-03-25 RX ADMIN — SIMVASTATIN 40 MG: 40 TABLET, FILM COATED ORAL at 21:48

## 2017-03-25 RX ADMIN — ASPIRIN 81 MG: 81 TABLET, CHEWABLE ORAL at 21:49

## 2017-03-25 RX ADMIN — Medication 5 ML: at 14:00

## 2017-03-25 RX ADMIN — ENOXAPARIN SODIUM 30 MG: 30 INJECTION SUBCUTANEOUS at 21:44

## 2017-03-25 RX ADMIN — PANTOPRAZOLE SODIUM 40 MG: 40 TABLET, DELAYED RELEASE ORAL at 06:06

## 2017-03-25 NOTE — PROGRESS NOTES
Neurology:EMG?NCV completed sensory nerve action potentials normal low amplitude to no motor responses  Evidence of ongoing denervation in three limbs with fibrillation potentials in lumbar,thoracic,and bilateral cervical paraspinal mm.  Picture unfortunately suggestive op very proximal process likely at the level of the anterior horn cell  Findings reviewed with the family and patient

## 2017-03-25 NOTE — PROGRESS NOTES
Hospitalist Progress Note    3/25/2017  Admit Date: 3/23/2017  2:04 PM   NAME: Xu Moncada   :  1940   DOS:              17  MRN:  [id number]   Attending: Kelley Uriarte MD  PCP:  Lyssa Bedoya MD  Treatment Team: Attending Provider: Kelley Uriarte MD; Consulting Provider: Linwood Verduzco MD; Utilization Review: Charmaine Worley; Care Manager: Goyo Forbes LMSW    Full Code     SUBJECTIVE:   As previously documented: \" Ms. Selam Lott is a pleasant 67 yo F who complains of increased RUE and RLE extremity weakness for months that is getting worse in the last 2 weeks. Using a walker. Complains also of increased dysphagia since her cervical surgery last year. Has extensive PMHX with cervical and lumbar radiculopathy, recent spinal cord stimulator for chronic lumbar pain laced on 2017. Complains of increased weight loss due to lack of appetite and dysphagia. Hx of esophageal dilatation 15 years prior, never follow-up after that. \"       17  A little improvement in R hand weakness. Denies any SOB, CP or abdominal pain. EMG concerning for ALS. 10+ ROS reviewed ane negative except for positive in HPI.    Allergies   Allergen Reactions    Lortab [Hydrocodone-Acetaminophen] Unknown (comments)    Lunesta [Eszopiclone] Other (comments)     Nightmares       Current Facility-Administered Medications   Medication Dose Route Frequency Provider Last Rate Last Dose    acetaminophen (TYLENOL) tablet 650 mg  650 mg Oral Q6H PRN Leighann Gonzalez MD   650 mg at 17 181    enoxaparin (LOVENOX) injection 30 mg  30 mg SubCUTAneous Q24H Leighann Gonzalez MD   30 mg at 17 220    aspirin chewable tablet 81 mg  81 mg Oral QHS Patel Coleman., PA   81 mg at 17 2200    mirtazapine (REMERON) tablet 15 mg  15 mg Oral QHS Patel Boothe, PA   15 mg at 17 2200    simvastatin (ZOCOR) tablet 40 mg  40 mg Oral QHS Patel Boothe, PA   40 mg at 17 2200    sodium chloride (NS) flush 5-10 mL  5-10 mL IntraVENous Q8H Ericauvella Carloz. PA   5 mL at 17 1400    sodium chloride (NS) flush 5-10 mL  5-10 mL IntraVENous PRN Ericauvella LAURIE Cruz        pantoprazole (PROTONIX) tablet 40 mg  40 mg Oral ACB Louvella Carloz., PA   40 mg at 17 0606         PHYSICAL EXAM     Visit Vitals    /67 (BP 1 Location: Left arm, BP Patient Position: At rest)    Pulse 83    Temp 98.3 °F (36.8 °C)    Resp 16    Ht 5' 2\" (1.575 m)    Wt 47.2 kg (104 lb)    SpO2 98%    BMI 19.02 kg/m2      Temp (24hrs), Av.7 °F (36.5 °C), Min:97.3 °F (36.3 °C), Max:98.3 °F (36.8 °C)    Oxygen Therapy  O2 Sat (%): 98 % (17 1545)  Pulse via Oximetry: 64 beats per minute (17 0448)  O2 Device: Room air (17 0448)  No intake or output data in the 24 hours ending 17 1717     Physical Exam:  General:         AAOx3. NAD. Afebrile. Cooperative  HEENT:               NCAT. Lungs:   CTABL. No wheezing/rhonchi/rales  Cardiovascular:   RRR. No pedal edema b/l. Abdomen:       S/nt/nd. +BS  Skin:         No rashes or lesions. Neurologic:    AAOx3. CN II- XII grossly WNL. No gross focal deficit except R sided weakness. Fasciculations in both upper extremities  Moves all extremities. Normal sensory. Normal gait. Psychiatric:         Euthymic. Normal affect.              DIAGNOSTIC STUDIES      Data Review:   Recent Results (from the past 24 hour(s))   PLEASE READ & DOCUMENT PPD TEST IN 24 HRS    Collection Time: 17  5:45 PM   Result Value Ref Range    PPD  Negative    mm Induration 0 mm   SED RATE, AUTOMATED    Collection Time: 17  5:55 AM   Result Value Ref Range    Sed rate, automated 30 0 - 30 mm/hr   CK    Collection Time: 17  5:55 AM   Result Value Ref Range     21 - 215 U/L   PHOSPHORUS    Collection Time: 17  5:55 AM   Result Value Ref Range    Phosphorus 3.1 2.3 - 3.7 MG/DL   METABOLIC PANEL, COMPREHENSIVE    Collection Time: 03/25/17  5:55 AM   Result Value Ref Range    Sodium 143 136 - 145 mmol/L    Potassium 3.6 3.5 - 5.1 mmol/L    Chloride 109 (H) 98 - 107 mmol/L    CO2 27 21 - 32 mmol/L    Anion gap 7 7 - 16 mmol/L    Glucose 77 65 - 100 mg/dL    BUN 10 8 - 23 MG/DL    Creatinine 0.64 0.6 - 1.0 MG/DL    GFR est AA >60 >60 ml/min/1.73m2    GFR est non-AA >60 >60 ml/min/1.73m2    Calcium 8.9 8.3 - 10.4 MG/DL    Bilirubin, total 0.4 0.2 - 1.1 MG/DL    ALT (SGPT) 20 12 - 65 U/L    AST (SGOT) 25 15 - 37 U/L    Alk. phosphatase 73 50 - 136 U/L    Protein, total 6.8 6.3 - 8.2 g/dL    Albumin 3.4 3.2 - 4.6 g/dL    Globulin 3.4 2.3 - 3.5 g/dL    A-G Ratio 1.0 (L) 1.2 - 3.5     CBC W/O DIFF    Collection Time: 03/25/17  5:55 AM   Result Value Ref Range    WBC 5.4 4.3 - 11.1 K/uL    RBC 3.99 (L) 4.05 - 5.25 M/uL    HGB 12.1 11.7 - 15.4 g/dL    HCT 36.1 35.8 - 46.3 %    MCV 90.5 79.6 - 97.8 FL    MCH 30.3 26.1 - 32.9 PG    MCHC 33.5 31.4 - 35.0 g/dL    RDW 12.6 11.9 - 14.6 %    PLATELET 268 138 - 771 K/uL    MPV 12.1 10.8 - 14.1 FL     Imaging /Procedures /Studies:    CT Results  (Last 48 hours)               03/24/17 1705  CT SPINE [facility] W CONT Final result    Impression:  Impression:  Anterior thecal sac indentation, spinal cord displacement at C5-C6   as described above. Multilevel degenerative disc disease. No mass, hematoma, or granuloma associated with the thoracic spinal stimulator.        Narrative:  Title:  Cervical and thoracic CT myelogram.       Indication:  Upper extremity radiculopathy, swallowing difficulty, right-sided   weakness, dysphasia, right upper and lower extremity weakness x2 weeks, stroke,   increased right upper and right lower extremity weakness for months that has   gotten worse in the last 2 weeks, increased dysphasia since cervical surgery   last year, history of cervical and lumbar radiculopathy, recent spinal cord   stimulator placement for chronic lumbar pain, weight loss due to poor appetite   and dysphagia. Technique:  Axial images through cervical and thoracic spine were obtained after   the intrathecal administration of contrast media. Intrathecal contrast was used   to best identify abnormal thecal sac impression. Images were reformatted in the   coronal and axial planes. All CT scans at this facility use automated exposure   control, dose modulation, iterative reconstruction, and/or weight based Ma   and/or kV dosing when appropriate to reduce radiation dose to as low as   reasonably achievable. Comparison:  MRI 2/18/2016. Findings:  Images through the base of the brain are unremarkable. Homogeneous   thyroid gland. Patchy, mosaic, subtle density in both lungs. Atelectasis versus   scarring at the lung bases. The visualized portion of liver, pancreas, spleen,   adrenal glands, and kidneys are unremarkable. Calcified atherosclerotic disease. Tracheal bronchial calcification. Coronary artery calcification. Cervical:       C1-C2:  No spinal stenosis or neural foraminal stenosis. C2-C3:  Mild central anterior thecal sac indentation secondary to disc bulge. C3-C4:  Loss of intervertebral disc height. Anterior thecal sac indentation   secondary to central disc bulge. Mild left neural foraminal narrowing. C4-C5:  Grade 1 anterolisthesis of C4 on C5. Mild anterior thecal sac   indentation secondary to disc bulge. No neural foraminal narrowing. C5-C6:  Anterior fusion with metal hardware. Disc/osteophyte indents the spinal   cord and displaces it posteriorly. However, a rim of CSF remains. Minimal   bilateral neural foraminal narrowing. C6-C7:  Anterior thecal sac flattening secondary to diffuse disc bulge. No   neural foraminal narrowing. C7-T1:  No spinal stenosis or neural foraminal stenosis       Thoracic:       Spinal stimulator wires enter the thecal sac at T9-T10. The stimulator   terminates at T6-T7.  No associated granuloma or mass identified. T1-T2:  No spinal stenosis or neural foraminal stenosis. T2-T3:  Small osteophyte results in right posterolateral thecal sac indentation. T3-T4:  No spinal stenosis or neural foraminal stenosis. T4-T5:  No spinal stenosis or neural foraminal stenosis. T5-T6:  No spinal stenosis or neural foraminal stenosis. T6-T7:  No spinal stenosis or neural foraminal stenosis. T7-T8:  No spinal stenosis or neural foraminal stenosis. T8-T9:  Right anterior thecal sac indentation secondary to disc/osteophyte   complex. T9-T10:  Central and right lateral disc bulge results in thecal sac indentation. No spinal stenosis. T10-T11:  Mild bilateral facet overgrowth results in mild thecal sac   indentation. T11-T12:  Left lateral/left posterior disc bulge results in thecal sac   indentation and mild left neural foraminal narrowing. T12-L1:  No spinal stenosis or neural foraminal stenosis. L1-L2:  Bilateral neural foraminal narrowing. Posterior/left lateral disc bulge. No spinal stenosis or neural foraminal stenosis. Although incompletely imaged, there appears to be a fairly significant scoliosis   in the lumbar region. 03/23/17 1532  CT HEAD WO CONT Final result    Impression:  IMPRESSION:  Normal unenhanced CT of the brain for age. No acute intracranial   abnormality. Narrative:  Examination: CT scan of the brain without contrast.       History: cva, right hemiplegia, 68 years Female Complains of right leg and right   arm weakness x 2 weeks. States she believes she had a stroke       Technique: 5 mm axial imaging of the brain from the posterior fossa to the   vertex. Radiation dose reduction techniques were used for this study:  Our CT   scanners use one or all of the following: Automated exposure control, adjustment   of the mA and/or kVp according to patient's size, iterative reconstruction.        Comparison: None available       Findings: The brain parenchyma appears within normal limits for age. The   ventricles, sulci are age-appropriate. No intracranial hemorrhage or extra-axial   collection is identified. No evidence of acute infarct. No mass effect or   midline shift is present. Basal cisterns are intact. The visualized paranasal   sinuses and mastoid air cells are clear. The orbits, bones, and soft tissues are   normal in appearance. Labs and Studies from previous 24 hours have been personally reviewed by myself    De Mallory 96 Problems    Diagnosis Date Noted    ALS (amyotrophic lateral sclerosis) ([facility] [address] 03/25/2017    Right sided weakness 03/23/2017    Dysphagia 03/23/2017    Mixed hyperlipidemia     GERD (gastroesophageal reflux disease)      controlled with med         Plan:  R sided weakness: CT myelogram w/o acute pathology. EMG today with evidence of ongoing denervation with fibrillation potentials. Concern for ALS. ESR and CK WNL. SPEP, UPEP, RF, ALEKSANDAR pending    Dysphagia: EGD with sal duodenal ulcer - plan for Barium esophagogram on Monday. Duodenal ulcer: started on PPI. H Pylori pending. Advised against NSAID usage.     *because of patient's need for further diagnostic testing to confirm diagnosis of ALS, will convert to inpatient    DVT Prophylaxis: Lovenox SQ  Dispo: hopefully home on Monday, will need close neurology follow up      Rain Chua MD  03/25/17

## 2017-03-25 NOTE — PROGRESS NOTES
Problem: Self Care Deficits Care Plan (Adult)  Goal: *Acute Goals and Plan of Care (Insert Text)  GOALS:    1: Pt will perform toileting with minimal assistance and adaptive equipment as needed. 2: Pt will perform LB dressing with minimal assistance and adaptive equipment as needed. 3: Pt will perform bathing with minimal assistance and adaptive equipment as needed. 4: Pt will perform toilet transfers with minimal assistance and the least restrictive device. 5: Pt will tolerate 20 minutes of therapeutic activity with 2 rest breaks. 6. Pt will feed self with setup and adaptive equipment as needed. Time Frame: 7 visits      OCCUPATIONAL THERAPY: Initial Assessment 3/25/2017  OBSERVATION: Hospital Day: 3  [place] / [place] / Product Type: Medicare /      NAME/AGE/GENDER: Valerie Cesar is a 68 y.o. female    PRIMARY DIAGNOSIS:  Dysphagia, unspecified type [R13.10] ALS (amyotrophic lateral sclerosis) (HCC) ALS (amyotrophic lateral sclerosis) (HCC)  Procedure(s) (LRB):  ESOPHAGOGASTRODUODENOSCOPY (EGD) (N/A)  ESOPHAGEAL DILATION (N/A)  1 Day Post-Op  ICD-10: Treatment Diagnosis:        · Generalized Muscle Weakness (M62.81)  · Other lack of cordination (R27.8)   Precautions/Allergies:         Lortab [hydrocodone-acetaminophen] and Lunesta [eszopiclone]       ASSESSMENT:      Ms. Annette Yeboah presents supine in bed with  at side, agreeable to therapy. Pt is oriented x4; presents with overall weakness progressing over the last couple of weeks, especially the last couple of days. Pt reports she was able to perform ADLs with occasional assistance a month ago, recently progressing to moderate assistance for lower body dressing and bathing, minimal assistance for extended functional mobility, and maximal/total assist for homemaking. Today, pt can barely lift her arms and cannot complete feeding on her own. Neurology present prior to departure to perform nerve conduction testing. Pt presents with decreased activity tolerance, and decreased independence for self care, functional mobility, and ADL's. Requires skilled OT to maximize independence with self care tasks and functional transfers. Pt returned supine for testing all needs in reach, pt instructed to call for assistance; RN aware. Ms. Abiola Zepeda was discharged from our facility before further treatment could be provided in this setting. This section established at most recent assessment   PROBLEM LIST (Impairments causing functional limitations):  1. Decreased Strength  2. Decreased ADL/Functional Activities  3. Decreased Transfer Abilities  4. Decreased Ambulation Ability/Technique  5. Decreased Activity Tolerance  6. Decreased Work Simplification/Energy Conservation Techniques  7. Increased Fatigue    INTERVENTIONS PLANNED: (Benefits and precautions of occupational therapy have been discussed with the patient.)  1. Activities of daily living training  2. Adaptive equipment training  3. Donning&doffing training  4. Group therapy  5. Neuromuscular re-eduation  6. Sensory reintergration training  7. Theraputic activity  8. Theraputic exercise      TREATMENT PLAN: Frequency/Duration: Follow patient 3x/week to address above goals. Rehabilitation Potential For Stated Goals: GOOD      RECOMMENDED REHABILITATION/EQUIPMENT: (at time of discharge pending progress): Continue Skilled Therapy. OCCUPATIONAL PROFILE AND HISTORY:   History of Present Injury/Illness (Reason for Referral):  Ms. Abiola Zepeda is a pleasant 67 yo F who complains of increased RUE and RLE extremity weakness for months that is getting worse in the last 2 weeks. Using a walker. Complains also of increased dysphagia Since her cervical surgery last year. Has extensive PMHX with cervical and lumbar radiculopathy, recent spinal cord stimulator for chronic lumbar pain laced on 2/6/2017.  Complains of increased weight loss due to lack of appetite and dysphagia. Hx of esophageal dilatation 15 years prior, never follow-up after that. Will get CT myelogram, SP for swallowing study, neurology and GI consult - appreciate the help. Past Medical History/Comorbidities:   Ms. Selam Lott  has a past medical history of Blood loss anemia; CAD (coronary artery disease) (01/2014); Cervical myelopathy; Cervical spinal stenosis; Chronic lower back pain; GERD (gastroesophageal reflux disease); H/O methicillin resistant Staphylococcus aureus infection; History of staph infection (2012); Insomnia; Lumbar radiculopathy, acute; Lumbar stenosis without neurogenic claudication; Mixed hyperlipidemia; Nausea & vomiting; Other intervertebral disc degeneration, lumbar region; Primary insomnia; PUD (peptic ulcer disease); Scoliosis, idiopathic, infantile; and Spinal stenosis, cervical region. She also has no past medical history of Unspecified adverse effect of anesthesia. Ms. Selam Lott  has a past surgical history that includes lap cholecystectomy; appendectomy; bladder repair (2010); hysterectomy (1972); oophorectomy (1999); orthopaedic (01/2015); colonoscopy (01/12/2016); endoscopy (10/2015); angioplasty; tumor removal (at age of 40); cervical fusion (07/2016); cataract removal (Bilateral); vascular surgery procedure unlist; cardiac surg procedure unlist; and endoscopy (11/02/2015).   Social History/Living Environment:   Home Environment: Private residence  # Steps to Enter: 5  Rails to Enter: Yes  Hand Rails : Left  One/Two Story Residence: Two story, live on 1st floor  Living Alone: No  Support Systems: Child(alex), Spouse/Significant Other/Partner  Patient Expects to be Discharged to[de-identified] Unknown  Current DME Used/Available at Home: Walker, rollator, Walker, rolling, Cane, straight  Tub or Shower Type: Shower  Prior Level of Function/Work/Activity:  Requires assistance      Number of Personal Factors/Comorbidities that affect the Plan of Care: Extensive review of physical, cognitive, and psychosocial performance (3+):  HIGH COMPLEXITY   ASSESSMENT OF OCCUPATIONAL PERFORMANCE[de-identified]   Activities of Daily Living:           Basic ADLs (From Assessment) Complex ADLs (From Assessment)   Basic ADL  Feeding: Minimum assistance  Oral Facial Hygiene/Grooming: Minimum assistance  Bathing: Moderate assistance  Upper Body Dressing: Minimum assistance  Lower Body Dressing: Maximum assistance  Toileting: Moderate assistance Instrumental ADL  Meal Preparation: Total assistance  Homemaking: Total assistance   Grooming/Bathing/Dressing Activities of Daily Living     Cognitive Retraining  Safety/Judgement: Fall prevention                       Bed/Mat Mobility  Rolling: Minimum assistance  Supine to Sit: Minimum assistance  Sit to Supine: Minimum assistance  Sit to Stand: Contact guard assistance  Scooting: Contact guard assistance          Most Recent Physical Functioning:   Gross Assessment:  AROM: Generally decreased, functional  Strength: Generally decreased, functional  Coordination: Generally decreased, functional               Posture:  Posture (WDL): Exceptions to WDL  Posture Assessment: Forward head, Rounded shoulders, Kyphosis, Trunk flexion  Balance:  Sitting: Intact  Standing: Impaired  Standing - Static: Fair  Standing - Dynamic : Fair Bed Mobility:  Rolling: Minimum assistance  Supine to Sit: Minimum assistance  Sit to Supine: Minimum assistance  Scooting: Contact guard assistance  Wheelchair Mobility:     Transfers:  Sit to Stand: Contact guard assistance  Stand to Sit: Contact guard assistance                    Patient Vitals for the past 6 hrs:       BP BP Patient Position SpO2 Pulse   03/25/17 1545 128/67 At rest 98 % 83        Mental Status  Neurologic State: Alert  Orientation Level: Oriented X4  Cognition: Follows commands  Perception: Appears intact  Perseveration: No perseveration noted  Safety/Judgement: Fall prevention                               Physical Skills Involved:  1.  Range of Motion  2. Balance  3. Mobility  4. Strength  5. Endurance  6. Fine or Gross Motor Coordination  7. Dexterity Cognitive Skills Affected (resulting in the inability to perform in a timely and safe manner):  1. WFLs Psychosocial Skills Affected:  1. Active Use of Coping Strategies  2. Environmental Adaptations   Number of elements that affect the Plan of Care: 5+:  HIGH COMPLEXITY   CLINICAL DECISION MAKIN [facility] Inpatient Short Form  How much help from another person does the patient currently need. .. Total A Lot A Little None   1. Putting on and taking off regular lower body clothing?   [ ] 1   [X] 2   [ ] 3   [ ] 4   2. Bathing (including washing, rinsing, drying)? [ ] 1   [X] 2   [ ] 3   [ ] 4   3. Toileting, which includes using toilet, bedpan or urinal?   [ ] 1   [X] 2   [ ] 3   [ ] 4   4. Putting on and taking off regular upper body clothing?   [ ] 1   [X] 2   [ ] 3   [ ] 4   5. Taking care of personal grooming such as brushing teeth? [ ] 1   [ ] 2   [X] 3   [ ] 4   6. Eating meals? [ ] 1   [ ] 2   [X] 3   [ ] 4   © , Trustees of [address] [place] [address], under license to [employer]. All rights reserved    Score:  Initial: 14 Most Recent: X (Date: -- )     Interpretation of Tool:  Represents activities that are increasingly more difficult (i.e. Bed mobility, Transfers, Gait). Score 24 23 22-20 19-15 14-10 9-7 6       Modifier CH CI CJ CK CL CM CN         · Self Care:               - CURRENT STATUS:    CL - 60%-79% impaired, limited or restricted               - GOAL STATUS:           CK - 40%-59% impaired, limited or restricted               - D/C STATUS:                       ---------------To be determined---------------  [place] / [place] / Product Type: Medicare /       Medical Necessity:     · Patient demonstrates good rehab potential due to higher previous functional level.   Reason for Services/Other Comments:  · Patient continues to require skilled intervention due to patient unable to attend/participate in therapy as expected. Use of outcome tool(s) and clinical judgement create a POC that gives a: MODERATE COMPLEXITY             TREATMENT:   (In addition to Assessment/Re-Assessment sessions the following treatments were rendered)      Pre-treatment Symptoms/Complaints:  Decreased ability to perform ADLs, self care, and functional mobility; decreased tolerance for activities  Pain: Initial:   Pain Intensity 1: 0  Post Session:  0      Assessment/Reassessment only, no treatment provided today     Braces/Orthotics/Lines/Etc:   · IV  · O2 Device: Room air  Treatment/Session Assessment:    · Response to Treatment:  Agrees to therapy  · Interdisciplinary Collaboration:  · Occupational Therapist  · Registered Nurse  · After treatment position/precautions:  · Bed/Chair-wheels locked  · Call light within reach  · RN notified  · Family at bedside  · Compliance with Program/Exercises: Will assess as treatment progresses. · Recommendations/Intent for next treatment session: \"Next visit will focus on advancements to more challenging activities and reduction in assistance provided\".   Total Treatment Duration:  OT Patient Time In/Time Out  Time In: 1137  Time Out: [address], OTR/L

## 2017-03-25 NOTE — PROGRESS NOTES
GI DAILY PROGRESS NOTE/Sign Off Note    Admit Date:  3/23/2017    Today's Date:  3/25/2017    CC:  Duodenal ulcer, dysphagia    Subjective:     Patient S/P EGD yesterday with empiric dilation. Ate a muffin this morning with no difficulty swallowing. Admits to large use of Advil prior to admission that may be etiology of ulcer noted on exam yesterday. EGD 3/24/17 - Dr Beebe Rafael:   1. Small duodenal ulcer  2. Normal appearing esophagus  3. S/P empiric esophageal dilation up to 16 mm with no resistance    Recommendations:  1. Follow up with inpatient team   2. PPI  3. Consider modified barium esophagram for evaluation of oropharyngeal dysphagia   4. Will check serology for H. Pylori, treat if positive     Medications:   Current Facility-Administered Medications   Medication Dose Route Frequency    acetaminophen (TYLENOL) tablet 650 mg  650 mg Oral Q6H PRN    enoxaparin (LOVENOX) injection 30 mg  30 mg SubCUTAneous Q24H    aspirin chewable tablet 81 mg  81 mg Oral QHS    mirtazapine (REMERON) tablet 15 mg  15 mg Oral QHS    simvastatin (ZOCOR) tablet 40 mg  40 mg Oral QHS    sodium chloride (NS) flush 5-10 mL  5-10 mL IntraVENous Q8H    sodium chloride (NS) flush 5-10 mL  5-10 mL IntraVENous PRN    pantoprazole (PROTONIX) tablet 40 mg  40 mg Oral ACB       Review of Systems:  ROS was obtained, with pertinent positives as listed above. No chest pain or SOB. Diet:  Gi soft    Objective:   Vitals:  Visit Vitals    /59 (BP 1 Location: Left arm, BP Patient Position: At rest)    Pulse 64    Temp 97.7 °F (36.5 °C)    Resp 17    Ht 5' 2\" (1.575 m)    Wt 47.2 kg (104 lb)    SpO2 95%    BMI 19.02 kg/m2     Intake/Output:     03/23 1901 - 03/25 0700  In: 400 [I.V.:400]  Out: 0   Exam:  General appearance: alert, cooperative, no distress  Lungs: clear to auscultation bilaterally anteriorly  Heart: regular rate and rhythm  Abdomen: soft, non-tender.  Bowel sounds normal. No masses, no organomegaly  Extremities: extremities normal, atraumatic, no cyanosis or edema  Neuro:  alert and oriented    Data Review (Labs):    Recent Labs      03/25/17   0555  03/24/17   0632  03/23/17   1122   WBC  5.4  5.2  5.9   HGB  12.1  11.7  12.8   HCT  36.1  35.9  38.9   PLT  207  189  206   MCV  90.5  92.5  92.6   NA  143  143  139   K  3.6  3.9  4.3   CL  109*  107  104   CO2  27  29  29   BUN  10  15  21   CREA  0.64  0.68  0.67   CA  8.9  8.9  9.2   GLU  77  77  98   AP  73   --   76   SGOT  25   --   24   ALT  20   --   23   TBILI  0.4   --   0.3   ALB  3.4   --   3.8   TP  6.8   --   7.3       Assessment:     Principal Problem:    Right sided weakness (3/23/2017)    Active Problems:    GERD (gastroesophageal reflux disease) ()      Overview: controlled with med      Mixed hyperlipidemia ()      Dysphagia (3/23/2017)        Plan:     67 yo female admitted with complaints a 2 week hx of progressive right sided extremity weakness for months that is getting worse in the last 2 weeks. Additionally noted to have dysphagia since spinal surgery last year and she underwent EGD with empiric dilation yesterday. Duodenal ulcer also noted. Patient admits to large use of Advil prior to admission. Swallowing improved today despite no obvious stricture noted on EGD. 1.  Await h pylori serology  2. Speech evaluation pending  3. We will sign off and plan follow-up as an outpatient in our office for continued evaluation, pending above    Patient is seen and examined in collaboration with Dr. Deandra Krishnan. Assessment and plan as per Dr. Deandra Krishnan. Leonardo Dee NP    I have seen and examined the patient, and I have directed and agreed to the plan as described. We will arrange office follow up and are signing off. Please call as needed.     Osmin Theodore MD

## 2017-03-26 VITALS
TEMPERATURE: 97.2 F | OXYGEN SATURATION: 97 % | WEIGHT: 104 LBS | DIASTOLIC BLOOD PRESSURE: 70 MMHG | HEIGHT: 62 IN | HEART RATE: 63 BPM | SYSTOLIC BLOOD PRESSURE: 119 MMHG | RESPIRATION RATE: 17 BRPM | BODY MASS INDEX: 19.14 KG/M2

## 2017-03-26 PROCEDURE — 65270000029 HC RM PRIVATE

## 2017-03-26 PROCEDURE — 74011250637 HC RX REV CODE- 250/637: Performed by: INTERNAL MEDICINE

## 2017-03-26 PROCEDURE — 99218 HC RM OBSERVATION: CPT

## 2017-03-26 PROCEDURE — 74011250637 HC RX REV CODE- 250/637

## 2017-03-26 RX ORDER — RILUZOLE 50 MG/1
50 TABLET, FILM COATED ORAL EVERY 12 HOURS
Qty: 60 TAB | Refills: 1 | Status: SHIPPED | OUTPATIENT
Start: 2017-03-26 | End: 2017-04-17 | Stop reason: SDUPTHER

## 2017-03-26 RX ORDER — FACIAL-BODY WIPES
10 EACH TOPICAL DAILY PRN
Status: DISCONTINUED | OUTPATIENT
Start: 2017-03-26 | End: 2017-03-26 | Stop reason: HOSPADM

## 2017-03-26 RX ADMIN — BISACODYL 10 MG: 10 SUPPOSITORY RECTAL at 09:00

## 2017-03-26 RX ADMIN — PANTOPRAZOLE SODIUM 40 MG: 40 TABLET, DELAYED RELEASE ORAL at 06:29

## 2017-03-26 NOTE — PROGRESS NOTES
Care Management Interventions  PCP Verified by CM:  Frida Goldsmith MD)  Mode of Transport at Discharge:  (family)  Transition of Care Consult (CM Consult): [facility])  [address] Yes  Discharge Durable Medical Equipment: No  Physical Therapy Consult: Yes  Occupational Therapy Consult: Yes  Current Support Network: Lives with Spouse  Confirm Follow Up Transport: Family  Plan discussed with Pt/Family/Caregiver: Yes  Freedom of Choice Offered: Yes  Discharge Location  Discharge Placement: Home with home health (Pt for D/C home with spouse.   Referral to [facility] for supportive care follow up.  )

## 2017-03-26 NOTE — PROGRESS NOTES
[address]  Face to Face Encounter    Patients Name: Xu Moncada    YOB: 1940    Primary Diagnosis: ALS     Date of Face to Face:   3/26/2017       Ordering Physician: Tracy Pineda MD                           Face to Face Encounter findings are related to primary reason for home care:   yes. 1. I certify that the patient needs intermittent care as follows: skilled nursing care:  skilled observation/assessment, patient education  physical therapy: strengthening  occupational therapy:  ADL safety (ie. cooking, bathing, dressing)    2. I certify that this patient is homebound, that is: 1) patient requires the use of a assistive device, special transportation, or assistance of another to leave the home; or 2) patient's condition makes leaving the home medically contraindicated; and 3) patient has a normal inability to leave the home and leaving the home requires considerable and taxing effort. Patient may leave the home for infrequent and short duration for medical reasons, and occasional absences for non-medical reasons. Homebound status is due to the following functional limitations: Patient with strength deficits limiting the performance of all ADL's without caregiver assistance or the use of an assistive device. 3. I certify that this patient is under my care and that I, or a nurse practitioner or [facility], or clinical nurse specialist, or certified nurse midwife, working with me, had a Face-to-Face Encounter that meets the physician Face-to-Face Encounter requirements.   The following are the clinical findings from the [address] [place] [address] encounter that support the need for skilled services and is a summary of the encounter: see hospital medical record    See discharge summary      Megan Rosen LMSW  3/26/2017      THE FOLLOWING TO BE COMPLETED BY THE COMMUNITY PHYSICIAN:    I concur with the findings described above from the [facility] encounter that this patient is homebound and in need of a skilled service.     Certifying Physician: _____________________________________      Printed Certifying Physician Name: _____________________________________    Date: _________________

## 2017-03-26 NOTE — PROGRESS NOTES
Discharge instructions given and discussed with patient and . Prescription also given to . IV removed. Patient getting dressing and will be ready to be wheeled down shortly.

## 2017-03-26 NOTE — DISCHARGE SUMMARY
Hospitalist Discharge Summary     Patient ID:  Breana Dalton  [id number]  68 y.o.  1940  Admit date: 3/23/2017  2:04 PM  Discharge date and time: 3/26/2017  Attending: No att. providers found  PCP:  Charles Chen MD  Treatment Team: Consulting Provider: Elicia Resendiz MD; Utilization Review: Liz Lewis; Care Manager: Anca Urbina LMSW    Principal Diagnosis ALS (amyotrophic lateral sclerosis) [facility])   Hospital Problems as of 3/26/2017  Date Reviewed: 1/19/2017          Codes Class Noted - Resolved POA    * (Principal)[facility] lateral [facility]) ([facility] ICD-10-CM: G12.21  ICD-9-CM: 335.20  3/25/2017 - Present Yes        Right sided weakness ICD-10-CM: M62.81  ICD-9-CM: 728.87  3/23/2017 - Present Yes        Dysphagia ICD-10-CM: R13.10  ICD-9-CM: 787.20  3/23/2017 - Present Yes        GERD (gastroesophageal reflux disease) ICD-10-CM: K21.9  ICD-9-CM: 530.81  Unknown - Present Yes    Overview Signed 1/29/2015 11:24 AM by Luba Hinton     controlled with med             Mixed hyperlipidemia ICD-10-CM: E78.2  ICD-9-CM: 272.2  Unknown - Present Yes              HPI: 68 y.o. female that presented to the ED with 2 week history of right sided weakness. She also reports dysphagia associated with 15 pound weight loss over the past 2 months. She was scheduled to see neurology as OP in May. Head CT detailed below. She cannot have MRI as he has spinal stimulator. Hospital Course: 68 yoF admitted with increased RUE and RLE extremity weakness for months that is getting worse in the last 2 weeks. Now having to use a walker. Complained also of increased dysphagia since her cervical surgery last year. Has extensive PMHX with cervical and lumbar radiculopathy, recent spinal cord stimulator for chronic lumbar pain placed on 2/6/2017. Also with increased weight loss due to lack of appetite and dysphagia. Had EGD with GI on 3/24 with small duodenal ulcer with minor esophageal dilation.  Seen by neurology who performed EMG while inpatient, which was likely consistent with ALS. She was started on riluzole at discharge per neuro recs. Was planning to perform barium swallow to assess dysphagia on 3/27, but family wanted to take her home. She can have this done as an outpatient if needed. Dr. Lazarus Pho is arranging close followup with the [address] [facility] clinic at [facility]. She will be discharged with [facility] services, including PT/OT. Significant Diagnostic Studies:   Labs: Results:       Chemistry Recent Labs      03/25/17   0555  03/24/17   0632   GLU  77  77   NA  143  143   K  3.6  3.9   CL  109*  107   CO2  27  29   BUN  10  15   CREA  0.64  0.68   CA  8.9  8.9   AGAP  7  7   AP  73   --    TP  6.8   --    ALB  3.4   --    GLOB  3.4   --    AGRAT  1.0*   --       CBC w/Diff Recent Labs      03/25/17   0555  03/24/17   0632   WBC  5.4  5.2   RBC  3.99*  3.88*   HGB  12.1  11.7   HCT  36.1  35.9   PLT  207  189      Cardiac Enzymes Recent Labs      03/25/17   0555   CPK  117      Coagulation No results for input(s): PTP, INR, APTT in the last 72 hours. No lab exists for component: INREXT, INREXT    Lipid Panel Lab Results   Component Value Date/Time    Cholesterol, total 201 01/19/2017 03:26 PM    HDL Cholesterol 102 01/19/2017 03:26 PM    LDL, calculated 78 01/19/2017 03:26 PM    VLDL, calculated 21 01/19/2017 03:26 PM    Triglyceride 103 01/19/2017 03:26 PM      BNP No results for input(s): BNPP in the last 72 hours. Liver Enzymes Recent Labs      03/25/17   0555   TP  6.8   ALB  3.4   AP  73   SGOT  25      Thyroid Studies Lab Results   Component Value Date/Time    T4, Total 9.5 03/13/2017 02:45 PM    TSH 2.100 03/13/2017 02:45 PM            Discharge Exam:  Visit Vitals    /70 (BP 1 Location: Left arm, BP Patient Position: At rest)    Pulse 63    Temp 97.2 °F (36.2 °C)    Resp 17    Ht 5' 2\" (1.575 m)    Wt 47.2 kg (104 lb)    SpO2 97%    BMI 19.02 kg/m2     General: AAOx3. NAD. Afebrile. Cooperative  HEENT: NCAT. Lungs:   CTABL. No wheezing/rhonchi/rales  Cardiovascular:   RRR. No pedal edema b/l. Abdomen:  S/nt/nd. +BS  Neurologic:  AAOx3. CN II- XII grossly WNL. No gross focal deficit except R sided weakness. Fasciculations in both upper extremities  Moves all extremities. Psychiatric: Euthymic. Normal affect. Disposition: home with [facility]  Discharge Condition: stable  Patient Instructions:   Discharge Medication List as of 3/26/2017 12:52 PM      START taking these medications    Details   riluzole (RILUTEK) tablet Take 1 Tab by mouth every twelve (12) hours. , Print, Disp-60 Tab, R-1         CONTINUE these medications which have NOT CHANGED    Details   polyethylene glycol (MIRALAX) 17 gram packet Take 17 g by mouth every other day., Historical Med      simvastatin (ZOCOR) 40 mg tablet Take 1 Tab by mouth nightly. Indications: hypercholesterolemia, Normal, Disp-90 Tab, R-3      oxyCODONE-acetaminophen (PERCOCET 7.5) 7.5-325 mg per tablet Take 1 Tab by mouth every eight (8) hours as needed for Pain. Max Daily Amount: 3 Tabs., Print, Disp-30 Tab, R-0      cholecalciferol (VITAMIN D3) 1,000 unit tablet Take 1,000 Units by mouth daily (after lunch). , Historical Med      mirtazapine (REMERON) 15 mg tablet Take 1 Tab by mouth nightly. Patient needs an Office visit for further refill, Normal, Disp-90 Tab, R-3      omeprazole (PRILOSEC) 20 mg capsule Take 1 Cap by mouth every morning. Indications: GASTROESOPHAGEAL REFLUX, Normal, Disp-90 Cap, R-1      traMADol (ULTRAM) 50 mg tablet Take 1 Tab by mouth every eight (8) hours as needed for Pain. Max Daily Amount: 150 mg., Print, Disp-90 Tab, R-3      biotin 10,000 mcg cap Take  by mouth daily (after lunch). , Historical Med      LOTEMAX 0.5 % ophthalmic suspension Administer 1 Drop to both eyes two (2) times a day., Historical Med      aspirin 81 mg chewable tablet Take 81 mg by mouth nightly., Historical Med      multivitamin (ONE A DAY) tablet Take 1 Tab by mouth daily (after lunch). , Historical Med             Activity: PT/OT per Home Health  Diet: Regular Diet    Follow-up  -   PCP in 1-2 weeks  -   ALS clinic at [facility], needs ASAP appt    Signed:  Berenice Toussaint MD  3/26/2017  12:35 PM

## 2017-03-27 ENCOUNTER — PATIENT OUTREACH (OUTPATIENT)
Dept: CASE MANAGEMENT | Age: 77
End: 2017-03-27

## 2017-03-27 ENCOUNTER — HOME HEALTH ADMISSION (OUTPATIENT)
Dept: HOME HEALTH SERVICES | Facility: HOME HEALTH | Age: 77
End: 2017-03-27
Payer: MEDICARE

## 2017-03-27 LAB
ALBUMIN SERPL ELPH-MCNC: 3.7 G/DL (ref 3.2–5.6)
ALBUMIN/GLOB SERPL: 1.4 {RATIO}
ALPHA1 GLOB SERPL ELPH-MCNC: 0.25 G/DL (ref 0.1–0.4)
ALPHA2 GLOB SERPL ELPH-MCNC: 0.77 G/DL (ref 0.4–1.2)
ANA SER QL: NEGATIVE
B-GLOBULIN SERPL QL ELPH: 1.05 G/DL (ref 0.6–1.3)
ETHANOL BLD GC-MCNC: NEGATIVE %
GAMMA GLOB MFR SERPL ELPH: 0.63 G/DL (ref 0.5–1.6)
H PYLORI IGG SER IA-ACNC: <0.9 U/ML (ref 0–0.8)
IGA SERPL-MCNC: 224 MG/DL (ref 85–499)
IGG SERPL-MCNC: 670 MG/DL (ref 610–1616)
IGM SERPL-MCNC: 43 MG/DL (ref 35–242)
M PROTEIN SERPL ELPH-MCNC: NORMAL G/DL
PROT PATTERN SERPL ELPH-IMP: NORMAL
PROT PATTERN SPEC IFE-IMP: NORMAL
PROT SERPL-MCNC: 6.4 G/DL (ref 6.3–8.2)
RHEUMATOID FACT SER QL LA: NEGATIVE
SEE BELOW:, 164879: NORMAL

## 2017-03-27 NOTE — PROGRESS NOTES
Transition of Care Discharge Follow-Up Questionnaire       Date/Time of Call:   March 27, 2017 1:06PM   What was the patient hospitalized for? Patient hospitalized for ALS (Amyotrophic Lateral Sclerosis)            Does the patient understand his/her diagnosis and/or treatment and what happened during the hospitalization? Patient states understanding of diagnosis and treatment during hospitalization. Did the patient receive discharge instructions? Patient states discharge instructions explained and received before discharge to home. Review any discharge instructions (see notes in [facility]). Ask patient if they understand these. Do they have any questions? Patient states no questions at this time regarding discharge instructions. Were home services ordered (nursing, PT, OT, ST, etc.)? Patient states home health services ordered (PT/OT)        If so, has the first visit occurred? If not, why? (Assist with coordination of services if necessary.) Patient states first visit will occur tomorrow 03/28/2017. Was any DME ordered? Patient states no durable medical equipment ordered. If so, has it been received? If not, why?  (Assist with coordination of arranging DME orders if necessary. ) NA         Complete a review of all medications (new, continued and discontinued meds per the D[facility]C instructions and [facility] Care Coordinator reviewed all medications with patient per [facility], Riluzole one daily po every 12 hours            Were all new prescriptions filled? If not, why?  (Assist with obtainment of medications if necessary.) Patient states new prescription had to be ordered and will be available for  today. Patient states that her spouse will  prescription today. Does the patient understand the purpose and dosing instructions for all medications?   (If patient has questions, provide explanation and education.) Patient states understanding of medication purposes and dosing instructions. Does the patient have any problems in performing ADLs? (If patient is unable to perform ADLs - what is the limiting factor(s)? Do they have a support system that can assist? If no support system is present, discuss possible assistance that they may be able to obtain.) Patient states she is independent with ADL's and ambulation. Patient states that her spouse will assist her if she requires assistance. Does the patient have all follow-up appointments scheduled? Has transportation been arranged? [facility] follow-up should be within 7 days of discharge; all others should have PCP follow-up within 7 days of discharge; follow-ups with other specialists as appropriate or ordered.) Patient states follow up appointment scheduled with [facility] April 14, 2017 @ 2:15PM with Dr. Hank Austin. Patient states she will call and schedule follow up appointment with Dr. Jordi Roque (Neurology) today. Any other questions or concerns expressed by the patient? Patient expresses no questions or concerns. Schedule next appointment with STEVE BERG Coordinator or refer to RN Case Manager/  as appropriate. No further needs identified, patient instructed to call Care Coordinator if further questions or concerns arise.    XIOMARA Call Completed By: Miles Lewis LPN  Care Coordinator   [facility]

## 2017-03-28 ENCOUNTER — HOME CARE VISIT (OUTPATIENT)
Dept: SCHEDULING | Facility: HOME HEALTH | Age: 77
End: 2017-03-28
Payer: MEDICARE

## 2017-03-28 PROCEDURE — 400013 HH SOC

## 2017-03-28 PROCEDURE — G0299 HHS/HOSPICE OF RN EA 15 MIN: HCPCS

## 2017-03-28 PROCEDURE — 3331090001 HH PPS REVENUE CREDIT

## 2017-03-28 PROCEDURE — 3331090002 HH PPS REVENUE DEBIT

## 2017-03-29 ENCOUNTER — HOME CARE VISIT (OUTPATIENT)
Dept: HOME HEALTH SERVICES | Facility: HOME HEALTH | Age: 77
End: 2017-03-29
Payer: MEDICARE

## 2017-03-29 VITALS
RESPIRATION RATE: 19 BRPM | SYSTOLIC BLOOD PRESSURE: 128 MMHG | DIASTOLIC BLOOD PRESSURE: 84 MMHG | HEART RATE: 71 BPM | OXYGEN SATURATION: 98 % | TEMPERATURE: 97.8 F

## 2017-03-29 PROCEDURE — 3331090001 HH PPS REVENUE CREDIT

## 2017-03-29 PROCEDURE — 3331090002 HH PPS REVENUE DEBIT

## 2017-03-30 ENCOUNTER — HOME CARE VISIT (OUTPATIENT)
Dept: SCHEDULING | Facility: HOME HEALTH | Age: 77
End: 2017-03-30
Payer: MEDICARE

## 2017-03-30 VITALS
DIASTOLIC BLOOD PRESSURE: 78 MMHG | SYSTOLIC BLOOD PRESSURE: 132 MMHG | OXYGEN SATURATION: 98 % | RESPIRATION RATE: 16 BRPM | HEART RATE: 80 BPM | TEMPERATURE: 97.8 F

## 2017-03-30 PROCEDURE — 3331090002 HH PPS REVENUE DEBIT

## 2017-03-30 PROCEDURE — 3331090001 HH PPS REVENUE CREDIT

## 2017-03-30 PROCEDURE — G0153 HHCP-SVS OF S/L PATH,EA 15MN: HCPCS

## 2017-03-31 ENCOUNTER — HOME CARE VISIT (OUTPATIENT)
Dept: SCHEDULING | Facility: HOME HEALTH | Age: 77
End: 2017-03-31
Payer: MEDICARE

## 2017-03-31 PROCEDURE — 3331090001 HH PPS REVENUE CREDIT

## 2017-03-31 PROCEDURE — G0299 HHS/HOSPICE OF RN EA 15 MIN: HCPCS

## 2017-03-31 PROCEDURE — 3331090002 HH PPS REVENUE DEBIT

## 2017-04-01 PROCEDURE — 3331090001 HH PPS REVENUE CREDIT

## 2017-04-01 PROCEDURE — 3331090002 HH PPS REVENUE DEBIT

## 2017-04-02 PROCEDURE — 3331090001 HH PPS REVENUE CREDIT

## 2017-04-02 PROCEDURE — 3331090002 HH PPS REVENUE DEBIT

## 2017-04-03 ENCOUNTER — HOME CARE VISIT (OUTPATIENT)
Dept: SCHEDULING | Facility: HOME HEALTH | Age: 77
End: 2017-04-03
Payer: MEDICARE

## 2017-04-03 VITALS
OXYGEN SATURATION: 97 % | HEART RATE: 73 BPM | SYSTOLIC BLOOD PRESSURE: 120 MMHG | TEMPERATURE: 97.2 F | RESPIRATION RATE: 16 BRPM | DIASTOLIC BLOOD PRESSURE: 62 MMHG

## 2017-04-03 VITALS
RESPIRATION RATE: 16 BRPM | SYSTOLIC BLOOD PRESSURE: 124 MMHG | TEMPERATURE: 97.3 F | OXYGEN SATURATION: 94 % | HEART RATE: 76 BPM | DIASTOLIC BLOOD PRESSURE: 72 MMHG

## 2017-04-03 VITALS
OXYGEN SATURATION: 98 % | HEART RATE: 87 BPM | DIASTOLIC BLOOD PRESSURE: 70 MMHG | TEMPERATURE: 95 F | SYSTOLIC BLOOD PRESSURE: 110 MMHG

## 2017-04-03 PROCEDURE — G0153 HHCP-SVS OF S/L PATH,EA 15MN: HCPCS

## 2017-04-03 PROCEDURE — 3331090001 HH PPS REVENUE CREDIT

## 2017-04-03 PROCEDURE — G0152 HHCP-SERV OF OT,EA 15 MIN: HCPCS

## 2017-04-03 PROCEDURE — 3331090002 HH PPS REVENUE DEBIT

## 2017-04-04 ENCOUNTER — HOME CARE VISIT (OUTPATIENT)
Dept: HOME HEALTH SERVICES | Facility: HOME HEALTH | Age: 77
End: 2017-04-04
Payer: MEDICARE

## 2017-04-04 PROCEDURE — 3331090002 HH PPS REVENUE DEBIT

## 2017-04-04 PROCEDURE — 3331090001 HH PPS REVENUE CREDIT

## 2017-04-05 ENCOUNTER — HOME CARE VISIT (OUTPATIENT)
Dept: SCHEDULING | Facility: HOME HEALTH | Age: 77
End: 2017-04-05
Payer: MEDICARE

## 2017-04-05 PROCEDURE — 3331090001 HH PPS REVENUE CREDIT

## 2017-04-05 PROCEDURE — G0153 HHCP-SVS OF S/L PATH,EA 15MN: HCPCS

## 2017-04-05 PROCEDURE — 3331090002 HH PPS REVENUE DEBIT

## 2017-04-06 PROCEDURE — 3331090002 HH PPS REVENUE DEBIT

## 2017-04-06 PROCEDURE — 3331090001 HH PPS REVENUE CREDIT

## 2017-04-07 ENCOUNTER — HOME CARE VISIT (OUTPATIENT)
Dept: SCHEDULING | Facility: HOME HEALTH | Age: 77
End: 2017-04-07
Payer: MEDICARE

## 2017-04-07 VITALS
HEART RATE: 76 BPM | DIASTOLIC BLOOD PRESSURE: 66 MMHG | TEMPERATURE: 94.7 F | OXYGEN SATURATION: 98 % | RESPIRATION RATE: 18 BRPM | SYSTOLIC BLOOD PRESSURE: 112 MMHG

## 2017-04-07 PROCEDURE — G0158 HHC OT ASSISTANT EA 15: HCPCS

## 2017-04-07 PROCEDURE — G0299 HHS/HOSPICE OF RN EA 15 MIN: HCPCS

## 2017-04-07 PROCEDURE — 3331090002 HH PPS REVENUE DEBIT

## 2017-04-07 PROCEDURE — G0151 HHCP-SERV OF PT,EA 15 MIN: HCPCS

## 2017-04-07 PROCEDURE — 3331090001 HH PPS REVENUE CREDIT

## 2017-04-08 PROCEDURE — 3331090001 HH PPS REVENUE CREDIT

## 2017-04-08 PROCEDURE — 3331090002 HH PPS REVENUE DEBIT

## 2017-04-09 PROCEDURE — 3331090002 HH PPS REVENUE DEBIT

## 2017-04-09 PROCEDURE — 3331090001 HH PPS REVENUE CREDIT

## 2017-04-10 VITALS
SYSTOLIC BLOOD PRESSURE: 112 MMHG | DIASTOLIC BLOOD PRESSURE: 62 MMHG | OXYGEN SATURATION: 97 % | TEMPERATURE: 98 F | HEART RATE: 63 BPM | RESPIRATION RATE: 16 BRPM

## 2017-04-10 PROCEDURE — 3331090002 HH PPS REVENUE DEBIT

## 2017-04-10 PROCEDURE — 3331090001 HH PPS REVENUE CREDIT

## 2017-04-11 ENCOUNTER — HOME CARE VISIT (OUTPATIENT)
Dept: SCHEDULING | Facility: HOME HEALTH | Age: 77
End: 2017-04-11
Payer: MEDICARE

## 2017-04-11 VITALS
DIASTOLIC BLOOD PRESSURE: 62 MMHG | TEMPERATURE: 95.3 F | SYSTOLIC BLOOD PRESSURE: 112 MMHG | HEART RATE: 66 BPM | OXYGEN SATURATION: 96 % | RESPIRATION RATE: 18 BRPM

## 2017-04-11 VITALS — OXYGEN SATURATION: 98 %

## 2017-04-11 PROCEDURE — G0158 HHC OT ASSISTANT EA 15: HCPCS

## 2017-04-11 PROCEDURE — 3331090002 HH PPS REVENUE DEBIT

## 2017-04-11 PROCEDURE — 3331090001 HH PPS REVENUE CREDIT

## 2017-04-12 ENCOUNTER — HOME CARE VISIT (OUTPATIENT)
Dept: SCHEDULING | Facility: HOME HEALTH | Age: 77
End: 2017-04-12
Payer: MEDICARE

## 2017-04-12 VITALS
SYSTOLIC BLOOD PRESSURE: 110 MMHG | HEART RATE: 70 BPM | RESPIRATION RATE: 16 BRPM | DIASTOLIC BLOOD PRESSURE: 60 MMHG | TEMPERATURE: 97.8 F | OXYGEN SATURATION: 96 %

## 2017-04-12 PROCEDURE — G0157 HHC PT ASSISTANT EA 15: HCPCS

## 2017-04-12 PROCEDURE — 3331090002 HH PPS REVENUE DEBIT

## 2017-04-12 PROCEDURE — 3331090001 HH PPS REVENUE CREDIT

## 2017-04-13 ENCOUNTER — HOME CARE VISIT (OUTPATIENT)
Dept: HOME HEALTH SERVICES | Facility: HOME HEALTH | Age: 77
End: 2017-04-13
Payer: MEDICARE

## 2017-04-13 ENCOUNTER — HOME CARE VISIT (OUTPATIENT)
Dept: SCHEDULING | Facility: HOME HEALTH | Age: 77
End: 2017-04-13
Payer: MEDICARE

## 2017-04-13 VITALS
DIASTOLIC BLOOD PRESSURE: 62 MMHG | TEMPERATURE: 96.1 F | SYSTOLIC BLOOD PRESSURE: 118 MMHG | OXYGEN SATURATION: 95 % | HEART RATE: 86 BPM | RESPIRATION RATE: 16 BRPM

## 2017-04-13 PROCEDURE — 3331090001 HH PPS REVENUE CREDIT

## 2017-04-13 PROCEDURE — 3331090002 HH PPS REVENUE DEBIT

## 2017-04-13 PROCEDURE — G0299 HHS/HOSPICE OF RN EA 15 MIN: HCPCS

## 2017-04-14 ENCOUNTER — HOME CARE VISIT (OUTPATIENT)
Dept: SCHEDULING | Facility: HOME HEALTH | Age: 77
End: 2017-04-14
Payer: MEDICARE

## 2017-04-14 VITALS
RESPIRATION RATE: 18 BRPM | DIASTOLIC BLOOD PRESSURE: 64 MMHG | OXYGEN SATURATION: 96 % | SYSTOLIC BLOOD PRESSURE: 112 MMHG | HEART RATE: 87 BPM | TEMPERATURE: 96.2 F

## 2017-04-14 PROCEDURE — 3331090002 HH PPS REVENUE DEBIT

## 2017-04-14 PROCEDURE — 3331090001 HH PPS REVENUE CREDIT

## 2017-04-14 PROCEDURE — G0158 HHC OT ASSISTANT EA 15: HCPCS

## 2017-04-14 PROCEDURE — G0157 HHC PT ASSISTANT EA 15: HCPCS

## 2017-04-15 PROCEDURE — 3331090001 HH PPS REVENUE CREDIT

## 2017-04-15 PROCEDURE — 3331090002 HH PPS REVENUE DEBIT

## 2017-04-16 VITALS
DIASTOLIC BLOOD PRESSURE: 68 MMHG | OXYGEN SATURATION: 95 % | RESPIRATION RATE: 17 BRPM | HEART RATE: 70 BPM | SYSTOLIC BLOOD PRESSURE: 108 MMHG | TEMPERATURE: 97 F

## 2017-04-16 PROCEDURE — 3331090001 HH PPS REVENUE CREDIT

## 2017-04-16 PROCEDURE — 3331090002 HH PPS REVENUE DEBIT

## 2017-04-17 ENCOUNTER — HOME CARE VISIT (OUTPATIENT)
Dept: SCHEDULING | Facility: HOME HEALTH | Age: 77
End: 2017-04-17
Payer: MEDICARE

## 2017-04-17 VITALS
TEMPERATURE: 97 F | OXYGEN SATURATION: 97 % | DIASTOLIC BLOOD PRESSURE: 72 MMHG | SYSTOLIC BLOOD PRESSURE: 118 MMHG | RESPIRATION RATE: 16 BRPM

## 2017-04-17 VITALS
OXYGEN SATURATION: 98 % | SYSTOLIC BLOOD PRESSURE: 124 MMHG | HEART RATE: 78 BPM | DIASTOLIC BLOOD PRESSURE: 70 MMHG | RESPIRATION RATE: 18 BRPM | TEMPERATURE: 97.5 F

## 2017-04-17 PROCEDURE — G0153 HHCP-SVS OF S/L PATH,EA 15MN: HCPCS

## 2017-04-17 PROCEDURE — 3331090002 HH PPS REVENUE DEBIT

## 2017-04-17 PROCEDURE — 3331090001 HH PPS REVENUE CREDIT

## 2017-04-18 ENCOUNTER — HOME CARE VISIT (OUTPATIENT)
Dept: SCHEDULING | Facility: HOME HEALTH | Age: 77
End: 2017-04-18
Payer: MEDICARE

## 2017-04-18 VITALS
DIASTOLIC BLOOD PRESSURE: 74 MMHG | HEART RATE: 83 BPM | OXYGEN SATURATION: 97 % | SYSTOLIC BLOOD PRESSURE: 110 MMHG | RESPIRATION RATE: 18 BRPM | TEMPERATURE: 94.6 F

## 2017-04-18 PROCEDURE — G0157 HHC PT ASSISTANT EA 15: HCPCS

## 2017-04-18 PROCEDURE — 3331090001 HH PPS REVENUE CREDIT

## 2017-04-18 PROCEDURE — 3331090002 HH PPS REVENUE DEBIT

## 2017-04-19 ENCOUNTER — HOME CARE VISIT (OUTPATIENT)
Dept: SCHEDULING | Facility: HOME HEALTH | Age: 77
End: 2017-04-19
Payer: MEDICARE

## 2017-04-19 VITALS — DIASTOLIC BLOOD PRESSURE: 70 MMHG | SYSTOLIC BLOOD PRESSURE: 110 MMHG | HEART RATE: 84 BPM | OXYGEN SATURATION: 99 %

## 2017-04-19 PROCEDURE — G0152 HHCP-SERV OF OT,EA 15 MIN: HCPCS

## 2017-04-19 PROCEDURE — 3331090001 HH PPS REVENUE CREDIT

## 2017-04-19 PROCEDURE — 3331090002 HH PPS REVENUE DEBIT

## 2017-04-20 ENCOUNTER — HOME CARE VISIT (OUTPATIENT)
Dept: SCHEDULING | Facility: HOME HEALTH | Age: 77
End: 2017-04-20
Payer: MEDICARE

## 2017-04-20 PROCEDURE — 3331090002 HH PPS REVENUE DEBIT

## 2017-04-20 PROCEDURE — G0157 HHC PT ASSISTANT EA 15: HCPCS

## 2017-04-20 PROCEDURE — 3331090001 HH PPS REVENUE CREDIT

## 2017-04-21 PROCEDURE — 3331090002 HH PPS REVENUE DEBIT

## 2017-04-21 PROCEDURE — 3331090001 HH PPS REVENUE CREDIT

## 2017-04-22 PROCEDURE — 3331090001 HH PPS REVENUE CREDIT

## 2017-04-22 PROCEDURE — 3331090002 HH PPS REVENUE DEBIT

## 2017-04-23 VITALS
HEART RATE: 83 BPM | SYSTOLIC BLOOD PRESSURE: 110 MMHG | RESPIRATION RATE: 18 BRPM | TEMPERATURE: 97.5 F | DIASTOLIC BLOOD PRESSURE: 74 MMHG | OXYGEN SATURATION: 96 %

## 2017-04-23 PROCEDURE — 3331090002 HH PPS REVENUE DEBIT

## 2017-04-23 PROCEDURE — 3331090001 HH PPS REVENUE CREDIT

## 2017-04-24 PROCEDURE — 3331090002 HH PPS REVENUE DEBIT

## 2017-04-24 PROCEDURE — 3331090001 HH PPS REVENUE CREDIT

## 2017-04-25 ENCOUNTER — HOME CARE VISIT (OUTPATIENT)
Dept: HOME HEALTH SERVICES | Facility: HOME HEALTH | Age: 77
End: 2017-04-25
Payer: MEDICARE

## 2017-04-25 ENCOUNTER — HOME CARE VISIT (OUTPATIENT)
Dept: SCHEDULING | Facility: HOME HEALTH | Age: 77
End: 2017-04-25
Payer: MEDICARE

## 2017-04-25 VITALS
DIASTOLIC BLOOD PRESSURE: 64 MMHG | RESPIRATION RATE: 16 BRPM | TEMPERATURE: 97.1 F | OXYGEN SATURATION: 99 % | HEART RATE: 62 BPM | SYSTOLIC BLOOD PRESSURE: 118 MMHG

## 2017-04-25 PROCEDURE — 3331090002 HH PPS REVENUE DEBIT

## 2017-04-25 PROCEDURE — 3331090001 HH PPS REVENUE CREDIT

## 2017-04-25 PROCEDURE — G0153 HHCP-SVS OF S/L PATH,EA 15MN: HCPCS

## 2017-04-26 PROCEDURE — 3331090002 HH PPS REVENUE DEBIT

## 2017-04-26 PROCEDURE — 3331090001 HH PPS REVENUE CREDIT

## 2017-04-27 ENCOUNTER — HOME CARE VISIT (OUTPATIENT)
Dept: SCHEDULING | Facility: HOME HEALTH | Age: 77
End: 2017-04-27
Payer: MEDICARE

## 2017-04-27 PROCEDURE — 3331090001 HH PPS REVENUE CREDIT

## 2017-04-27 PROCEDURE — G0151 HHCP-SERV OF PT,EA 15 MIN: HCPCS

## 2017-04-27 PROCEDURE — 3331090002 HH PPS REVENUE DEBIT

## 2017-04-28 VITALS
HEART RATE: 94 BPM | RESPIRATION RATE: 20 BRPM | DIASTOLIC BLOOD PRESSURE: 78 MMHG | SYSTOLIC BLOOD PRESSURE: 102 MMHG | TEMPERATURE: 97.3 F | OXYGEN SATURATION: 94 %

## 2017-04-28 PROCEDURE — 3331090002 HH PPS REVENUE DEBIT

## 2017-04-28 PROCEDURE — 3331090001 HH PPS REVENUE CREDIT

## 2017-04-29 PROCEDURE — 3331090001 HH PPS REVENUE CREDIT

## 2017-04-29 PROCEDURE — 3331090002 HH PPS REVENUE DEBIT

## 2017-04-30 PROCEDURE — 3331090001 HH PPS REVENUE CREDIT

## 2017-04-30 PROCEDURE — 3331090002 HH PPS REVENUE DEBIT

## 2017-05-01 ENCOUNTER — HOME CARE VISIT (OUTPATIENT)
Dept: SCHEDULING | Facility: HOME HEALTH | Age: 77
End: 2017-05-01
Payer: MEDICARE

## 2017-05-01 PROCEDURE — 3331090002 HH PPS REVENUE DEBIT

## 2017-05-01 PROCEDURE — 3331090001 HH PPS REVENUE CREDIT

## 2017-05-01 PROCEDURE — G0157 HHC PT ASSISTANT EA 15: HCPCS

## 2017-05-02 VITALS
RESPIRATION RATE: 18 BRPM | TEMPERATURE: 97.5 F | OXYGEN SATURATION: 94 % | SYSTOLIC BLOOD PRESSURE: 118 MMHG | DIASTOLIC BLOOD PRESSURE: 70 MMHG | HEART RATE: 84 BPM

## 2017-05-02 PROCEDURE — 3331090001 HH PPS REVENUE CREDIT

## 2017-05-02 PROCEDURE — 3331090002 HH PPS REVENUE DEBIT

## 2017-05-03 PROCEDURE — 3331090002 HH PPS REVENUE DEBIT

## 2017-05-03 PROCEDURE — 3331090001 HH PPS REVENUE CREDIT

## 2017-05-04 ENCOUNTER — HOME CARE VISIT (OUTPATIENT)
Dept: SCHEDULING | Facility: HOME HEALTH | Age: 77
End: 2017-05-04
Payer: MEDICARE

## 2017-05-04 VITALS
TEMPERATURE: 95 F | HEART RATE: 90 BPM | SYSTOLIC BLOOD PRESSURE: 118 MMHG | RESPIRATION RATE: 20 BRPM | DIASTOLIC BLOOD PRESSURE: 70 MMHG | OXYGEN SATURATION: 98 %

## 2017-05-04 PROCEDURE — 3331090001 HH PPS REVENUE CREDIT

## 2017-05-04 PROCEDURE — G0151 HHCP-SERV OF PT,EA 15 MIN: HCPCS

## 2017-05-04 PROCEDURE — 3331090002 HH PPS REVENUE DEBIT

## 2017-05-04 PROCEDURE — 3331090003 HH PPS REVENUE ADJ

## 2017-05-05 PROCEDURE — 3331090001 HH PPS REVENUE CREDIT

## 2017-05-05 PROCEDURE — 3331090002 HH PPS REVENUE DEBIT

## 2017-05-06 PROCEDURE — 3331090001 HH PPS REVENUE CREDIT

## 2017-05-06 PROCEDURE — 3331090002 HH PPS REVENUE DEBIT

## 2017-05-07 PROCEDURE — 3331090001 HH PPS REVENUE CREDIT

## 2017-05-07 PROCEDURE — 3331090002 HH PPS REVENUE DEBIT

## 2017-05-08 PROCEDURE — 3331090001 HH PPS REVENUE CREDIT

## 2017-05-08 PROCEDURE — 3331090002 HH PPS REVENUE DEBIT

## 2017-05-09 PROCEDURE — 3331090002 HH PPS REVENUE DEBIT

## 2017-05-09 PROCEDURE — 3331090001 HH PPS REVENUE CREDIT

## (undated) DEVICE — STANDARD HYPODERMIC NEEDLE,POLYPROPYLENE HUB: Brand: MONOJECT

## (undated) DEVICE — DRSG AQUACEL SURG 3.5X6IN -- CONVERT TO ITEM 369227

## (undated) DEVICE — SURGIFOAM SPNG SZ 100

## (undated) DEVICE — FLOSEAL MATRIX IS INDICATED IN SURGICAL PROCEDURES (OTHER THAN IN OPHTHALMIC) AS AN ADJUNCT TO HEMOSTASIS WHEN CONTROL OF BLEEDING BY LIGATURE OR CONVENTIONAL PROCEDURES IS INEFFECTIVE OR IMPRACTICAL.: Brand: FLOSEAL HEMOSTATIC MATRIX

## (undated) DEVICE — GOWN,PREVENTION PLUS,2XL,ST,22/CS: Brand: MEDLINE

## (undated) DEVICE — Device

## (undated) DEVICE — WAX SURG 2.5GM HEMSTAT BNE BEESWAX PARAFFIN ISO PALMITATE

## (undated) DEVICE — PATIENT PROGRAMMER

## (undated) DEVICE — SYSTEM SKIN CLSR 22CM DERMBND PRINEO

## (undated) DEVICE — SUTURE VCRL + SZ 3-0 L18IN ABSRB UD SH 1/2 CIR TAPERCUT NDL VCP864D

## (undated) DEVICE — KENDALL RADIOLUCENT FOAM MONITORING ELECTRODE RECTANGULAR SHAPE: Brand: KENDALL

## (undated) DEVICE — BIPOLAR SEALER 23-113-1 AQM 2.3 OM NEURO: Brand: AQUAMANTYS ®

## (undated) DEVICE — BLOCK BITE AD 60FR W/ VELC STRP ADDRESSES MOST PT AND

## (undated) DEVICE — CONTAINER,SPECIMEN,O.R.STRL,4.5OZ: Brand: MEDLINE

## (undated) DEVICE — DRAPE XR C ARM 41X74IN LF --

## (undated) DEVICE — KENDALL SCD EXPRESS SLEEVES, KNEE LENGTH, MEDIUM: Brand: KENDALL SCD

## (undated) DEVICE — 1010 S-DRAPE TOWEL DRAPE 10/BX: Brand: STERI-DRAPE™

## (undated) DEVICE — PACK PROCEDURE SURG POST LAMINECTOMY CDS

## (undated) DEVICE — SUTURE VCRL VIO BR 0 18IN C/R M04 J701D

## (undated) DEVICE — 48" PROBE COVER W/GEL, ULTRASOUND, STERILE: Brand: SITE-RITE

## (undated) DEVICE — (D)PREP SKN CHLRAPRP APPL 26ML -- CONVERT TO ITEM 371833

## (undated) DEVICE — CONNECTOR TBNG OD5-7MM O2 END DISP

## (undated) DEVICE — CHARGING SYSTEM

## (undated) DEVICE — 3M™ TEGADERM™ TRANSPARENT FILM DRESSING FRAME STYLE, 1626W, 4 IN X 4-3/4 IN (10 CM X 12 CM), 50/CT 4CT/CASE: Brand: 3M™ TEGADERM™

## (undated) DEVICE — UNIVERSAL DRAPES: Brand: MEDLINE INDUSTRIES, INC.

## (undated) DEVICE — AGENT HEMSTAT W3XL4IN OXIDIZED REGENERATED CELOS ABSRB FOR

## (undated) DEVICE — (D)SYR 10ML 1/5ML GRAD NSAF -- PKGING CHANGE USE ITEM 338027

## (undated) DEVICE — INTENDED FOR TISSUE SEPARATION, AND OTHER PROCEDURES THAT REQUIRE A SHARP SURGICAL BLADE TO PUNCTURE OR CUT.: Brand: BARD-PARKER SAFETY BLADES SIZE 15, STERILE

## (undated) DEVICE — SUTURE PERMAHAND SZ 0 L30IN NONABSORBABLE BLK L30MM PSL 3/8 590H

## (undated) DEVICE — CANNULA NSL ORAL AD FOR CAPNOFLEX CO2 O2 AIRLFE

## (undated) DEVICE — REM POLYHESIVE ADULT PATIENT RETURN ELECTRODE: Brand: VALLEYLAB

## (undated) DEVICE — 2000CC GUARDIAN II: Brand: GUARDIAN

## (undated) DEVICE — SOLUTION IV 1000ML 0.9% SOD CHL

## (undated) DEVICE — 3M™ TEGADERM™ TRANSPARENT FILM DRESSING FRAME STYLE, 1628, 6 IN X 8 IN (15 CM X 20 CM), 10/CT 8CT/CASE: Brand: 3M™ TEGADERM™